# Patient Record
Sex: FEMALE | Race: OTHER | URBAN - METROPOLITAN AREA
[De-identification: names, ages, dates, MRNs, and addresses within clinical notes are randomized per-mention and may not be internally consistent; named-entity substitution may affect disease eponyms.]

---

## 2019-02-25 ENCOUNTER — EMERGENCY (EMERGENCY)
Facility: HOSPITAL | Age: 24
LOS: 0 days | Discharge: HOME | End: 2019-02-25
Attending: EMERGENCY MEDICINE | Admitting: EMERGENCY MEDICINE

## 2019-02-25 VITALS
SYSTOLIC BLOOD PRESSURE: 108 MMHG | HEART RATE: 72 BPM | DIASTOLIC BLOOD PRESSURE: 53 MMHG | RESPIRATION RATE: 18 BRPM | OXYGEN SATURATION: 99 % | TEMPERATURE: 97 F

## 2019-02-25 DIAGNOSIS — M54.42 LUMBAGO WITH SCIATICA, LEFT SIDE: ICD-10-CM

## 2019-02-25 DIAGNOSIS — Z98.891 HISTORY OF UTERINE SCAR FROM PREVIOUS SURGERY: ICD-10-CM

## 2019-02-25 DIAGNOSIS — M54.5 LOW BACK PAIN: ICD-10-CM

## 2019-02-25 RX ORDER — IBUPROFEN 200 MG
600 TABLET ORAL ONCE
Qty: 0 | Refills: 0 | Status: COMPLETED | OUTPATIENT
Start: 2019-02-25 | End: 2019-02-25

## 2019-02-25 RX ADMIN — Medication 600 MILLIGRAM(S): at 12:21

## 2019-02-25 NOTE — ED PROVIDER NOTE - OBJECTIVE STATEMENT
24 y/o female no PMH p/w back pain. Recently gave birth 1 mo ago. Lower back pain in left lower back, radiates down left leg. No trauma. Pain has been going on for 2 weeks. No fever, chills, nt sweats, no h/o IVDA, no bowel/bladder incontinence.

## 2019-02-25 NOTE — ED PROVIDER NOTE - NS ED ROS FT
Eyes:  No visual changes, eye pain or discharge.  ENMT:  No hearing changes, pain, no sore throat or runny nose, no difficulty swallowing  Cardiac:  No chest pain, SOB or edema. No chest pain with exertion.  Respiratory:  No cough or respiratory distress. No hemoptysis. No history of asthma or RAD.  GI:  No nausea, vomiting, diarrhea or abdominal pain.  :  No dysuria, frequency or burning.  MS:  No myalgia, muscle weakness, joint pain or back pain.  Neuro:  No headache or weakness.  No LOC.  Skin:  No skin rash.

## 2019-02-25 NOTE — ED PROVIDER NOTE - NSFOLLOWUPCLINICS_GEN_ALL_ED_FT
[facility]  Medicine  [address], [place]   Phone: [phone]  Fax:   Follow Up Time: 1-3 Days

## 2019-02-25 NOTE — ED PROVIDER NOTE - PHYSICAL EXAMINATION
Constitutional: no fever, chills, nt sweats  MS:  Left lower back pain. Radiates down left leg. 5/5 strength in both LE's throughout. Diffuse tenderness overlying spine, paraspinal muscles. Straight leg test (+) b/l.   Neuro:  No headache or weakness.  No LOC. Sensation equal and intact in both LE's.   Skin:  No skin rash. No erythema/vesicles overlying back.

## 2019-02-25 NOTE — ED ADULT NURSE NOTE - OBJECTIVE STATEMENT
Pt c/o back pain x2 weeks. Pt denies trauma or injury. Pt states she had a  in January. Denies any other symptoms.

## 2019-02-25 NOTE — ED PROVIDER NOTE - PROGRESS NOTE DETAILS
Likely musculoskeletal pain. Motrin given in ED. Will give f/u to clinic here. Pt given strict return precautiosn including bowel/bladder incontinence, development of fever/rigors, inability to tolerate po. ATTENDING NOTE:   22 y/o F , presenting with left lower back pain radiating from SI joint down to posterior leg. No trauma, numbness, tingling, night sweats, weight loss, chronic steroid use, immunosuppressant fever or vomiting. No bowel or bladder incontinence. Exam: PE: normal HEENT. Heart RRR. Lungs CTAB. Abdomen soft NTND. Extremities 2+ b/l pulses intact. Neuro normal speech, strength 5/5 all extremities, sensation grossly intact. Normal gait.  A/P: Given exercises and care instructions. Return precautions advised for back pain. Likely sciatica.

## 2019-02-25 NOTE — ED PROVIDER NOTE - CLINICAL SUMMARY MEDICAL DECISION MAKING FREE TEXT BOX
pw back pain with posterior leg radiation, likely sciatica, given stretches and medication in the ED with improvement. Counseled on medications for back pain use and safety profile in breastfeeding. Patient to be discharged from ED. Any available test results were discussed with patient and/or family. Verbal instructions given, including instructions to return to ED immediately for any new, worsening, or concerning symptoms. Patient endorsed understanding. Written discharge instructions additionally given, including follow-up plan.

## 2019-03-08 RX ORDER — CYCLOBENZAPRINE HYDROCHLORIDE 10 MG/1
1 TABLET, FILM COATED ORAL
Qty: 15 | Refills: 0 | OUTPATIENT
Start: 2019-03-08 | End: 2019-03-12

## 2019-09-14 ENCOUNTER — EMERGENCY (EMERGENCY)
Facility: HOSPITAL | Age: 24
LOS: 0 days | Discharge: HOME | End: 2019-09-14
Admitting: EMERGENCY MEDICINE
Payer: MEDICAID

## 2019-09-14 VITALS
RESPIRATION RATE: 16 BRPM | HEART RATE: 101 BPM | TEMPERATURE: 96 F | DIASTOLIC BLOOD PRESSURE: 56 MMHG | SYSTOLIC BLOOD PRESSURE: 112 MMHG | OXYGEN SATURATION: 99 %

## 2019-09-14 VITALS — HEART RATE: 84 BPM

## 2019-09-14 DIAGNOSIS — R05 COUGH: ICD-10-CM

## 2019-09-14 DIAGNOSIS — R09.81 NASAL CONGESTION: ICD-10-CM

## 2019-09-14 DIAGNOSIS — J02.9 ACUTE PHARYNGITIS, UNSPECIFIED: ICD-10-CM

## 2019-09-14 PROCEDURE — 99283 EMERGENCY DEPT VISIT LOW MDM: CPT

## 2019-09-14 NOTE — ED PROVIDER NOTE - ENMT NEGATIVE STATEMENT, MLM
Ears: no ear pain and no hearing problems.Nose: + nasal congestion and no nasal drainage.Mouth/Throat: + sore throat; no dysphagia, no hoarseness.Neck: no lumps, no pain, no stiffness and no swollen glands.

## 2019-09-14 NOTE — ED PROVIDER NOTE - PATIENT PORTAL LINK FT
You can access the FollowMyHealth Patient Portal offered by [facility] by registering at the following website: [email or web address]. By joining [employer]’s FollowMyHealth portal, you will also be able to view your health information using other applications (apps) compatible with our system.

## 2019-09-14 NOTE — ED PROVIDER NOTE - NSFOLLOWUPCLINICS_GEN_ALL_ED_FT
[facility]  Medicine  [address], [place]   Phone: [phone]  Fax:   Follow Up Time: 1-3 Days

## 2019-09-14 NOTE — ED PROVIDER NOTE - NSFOLLOWUPINSTRUCTIONS_ED_ALL_ED_FT
Cough    Coughing is a reflex that clears your throat and your airways. Coughing helps to heal and protect your lungs. It is normal to cough occasionally, but a cough that happens with other symptoms or lasts a long time may be a sign of a condition that needs treatment. Coughing may be caused by infections, asthma or COPD, smoking, postnasal drip, gastroesophageal reflux, as well as other medical conditions. Take medicines only as instructed by your health care provider. Avoid anything that causes you to cough at work or at home including smoking.    SEEK IMMEDIATE MEDICAL CARE IF YOU HAVE THE FOLLOWING SYMPTOMS: coughing up blood, shortness of breath, rapid heart rate, chest pain, unexplained weight loss or night sweats.    Upper Respiratory Infection, Adult  An upper respiratory infection (URI) is a common viral infection of the nose, throat, and upper air passages that lead to the lungs. The most common type of URI is the common cold. URIs usually get better on their own, without medical treatment.    What are the causes?  A URI is caused by a virus. You may catch a virus by:    Breathing in droplets from an infected person's cough or sneeze.  Touching something that has been exposed to the virus (contaminated) and then touching your mouth, nose, or eyes.    What increases the risk?  You are more likely to get a URI if:    You are very young or very old.  It is joanne or winter.  You have close contact with others, such as at a , school, or health care facility.  You smoke.  You have long-term (chronic) heart or lung disease.  You have a weakened disease-fighting (immune) system.  You have nasal allergies or asthma.  You are experiencing a lot of stress.  You work in an area that has poor air circulation.  You have poor nutrition.    What are the signs or symptoms?  A URI usually involves some of the following symptoms:    Runny or stuffy (congested) nose.  Sneezing.  Cough.  Sore throat.  Headache.  Fatigue.  Fever.  Loss of appetite.  Pain in your forehead, behind your eyes, and over your cheekbones (sinus pain).  Muscle aches.  Redness or irritation of the eyes.  Pressure in the ears or face.    How is this diagnosed?  This condition may be diagnosed based on your medical history and symptoms, and a physical exam. Your health care provider may use a cotton swab to take a mucus sample from your nose (nasal swab). This sample can be tested to determine what virus is causing the illness.    How is this treated?  URIs usually get better on their own within 7–10 days. You can take steps at home to relieve your symptoms. Medicines cannot cure URIs, but your health care provider may recommend certain medicines to help relieve symptoms, such as:    Over-the-counter cold medicines.  Cough suppressants. Coughing is a type of defense against infection that helps to clear the respiratory system, so take these medicines only as recommended by your health care provider.  Fever-reducing medicines.    Follow these instructions at home:  Activity     Rest as needed.  If you have a fever, stay home from work or school until your fever is gone or until your health care provider says you are no longer contagious. Your health care provider may have you wear a face mask to prevent your infection from spreading.  Relieving symptoms     Gargle with a salt-water mixture 3–4 times a day or as needed. To make a salt-water mixture, completely dissolve ½–1 tsp of salt in 1 cup of warm water.  Use a cool-mist humidifier to add moisture to the air. This can help you breathe more easily.  Eating and drinking     Drink enough fluid to keep your urine pale yellow.  ImageEat soups and other clear broths.  General instructions     Take over-the-counter and prescription medicines only as told by your health care provider. These include cold medicines, fever reducers, and cough suppressants.  Do not use any products that contain nicotine or tobacco, such as cigarettes and e-cigarettes. If you need help quitting, ask your health care provider.   Stay away from secondhand smoke.  Stay up to date on all immunizations, including the yearly (annual) flu vaccine.  ImageKeep all follow-up visits as told by your health care provider. This is important.  How to prevent the spread of infection to others     ImageURIs can be passed from person to person (are contagious). To prevent the infection from spreading:    Wash your hands often with soap and water. If soap and water are not available, use hand .  Avoid touching your mouth, face, eyes, or nose.  Cough or sneeze into a tissue or your sleeve or elbow instead of into your hand or into the air.    Contact a health care provider if:  You are getting worse instead of better.  You have a fever or chills.  Your mucus is brown or red.  You have yellow or brown discharge coming from your nose.  You have pain in your face, especially when you bend forward.  You have swollen neck glands.  You have pain while swallowing.  You have white areas in the back of your throat.  Get help right away if:  You have shortness of breath that gets worse.  You have severe or persistent:    Headache.  Ear pain.  Sinus pain.  Chest pain.    You have chronic lung disease along with any of the following:    Wheezing.  Prolonged cough.  Coughing up blood.  A change in your usual mucus.    You have a stiff neck.  You have changes in your:    Vision.  Hearing.  Thinking.  Mood.    Summary  An upper respiratory infection (URI) is a common infection of the nose, throat, and upper air passages that lead to the lungs.  A URI is caused by a virus.  URIs usually get better on their own within 7–10 days.  Medicines cannot cure URIs, but your health care provider may recommend certain medicines to help relieve symptoms.

## 2019-09-14 NOTE — ED PROVIDER NOTE - OBJECTIVE STATEMENT
23 y/o F, no significant PMHx, presents to the ED with complaints of a cough and nasal congestion x one week. She states that her two children have been ill with similar symptoms. She denies productive cough, fever, chills, nausea, vomiting, chest pain, dyspnea, abdominal pain, back pain and recent travel. She has been taking OTC Dimetapp with some symptomatic improvement.

## 2019-09-21 ENCOUNTER — EMERGENCY (EMERGENCY)
Facility: HOSPITAL | Age: 24
LOS: 0 days | Discharge: HOME | End: 2019-09-21
Admitting: EMERGENCY MEDICINE
Payer: MEDICAID

## 2019-09-21 VITALS
WEIGHT: 132.94 LBS | OXYGEN SATURATION: 99 % | RESPIRATION RATE: 18 BRPM | SYSTOLIC BLOOD PRESSURE: 114 MMHG | TEMPERATURE: 97 F | HEART RATE: 101 BPM | DIASTOLIC BLOOD PRESSURE: 62 MMHG

## 2019-09-21 VITALS — HEART RATE: 87 BPM

## 2019-09-21 DIAGNOSIS — M54.5 LOW BACK PAIN: ICD-10-CM

## 2019-09-21 DIAGNOSIS — M54.6 PAIN IN THORACIC SPINE: ICD-10-CM

## 2019-09-21 DIAGNOSIS — M54.9 DORSALGIA, UNSPECIFIED: ICD-10-CM

## 2019-09-21 LAB
APPEARANCE UR: CLEAR — SIGNIFICANT CHANGE UP
BACTERIA # UR AUTO: NEGATIVE — SIGNIFICANT CHANGE UP
BILIRUB UR-MCNC: NEGATIVE — SIGNIFICANT CHANGE UP
COLOR SPEC: YELLOW — SIGNIFICANT CHANGE UP
DIFF PNL FLD: ABNORMAL
EPI CELLS # UR: 4 /HPF — SIGNIFICANT CHANGE UP (ref 0–5)
GLUCOSE UR QL: NEGATIVE — SIGNIFICANT CHANGE UP
HYALINE CASTS # UR AUTO: 1 /LPF — SIGNIFICANT CHANGE UP (ref 0–7)
KETONES UR-MCNC: SIGNIFICANT CHANGE UP
LEUKOCYTE ESTERASE UR-ACNC: NEGATIVE — SIGNIFICANT CHANGE UP
NITRITE UR-MCNC: NEGATIVE — SIGNIFICANT CHANGE UP
PH UR: 6 — SIGNIFICANT CHANGE UP (ref 5–8)
PROT UR-MCNC: ABNORMAL
RBC CASTS # UR COMP ASSIST: 3 /HPF — SIGNIFICANT CHANGE UP (ref 0–4)
SP GR SPEC: 1.04 — HIGH (ref 1.01–1.02)
UROBILINOGEN FLD QL: SIGNIFICANT CHANGE UP
WBC UR QL: 2 /HPF — SIGNIFICANT CHANGE UP (ref 0–5)

## 2019-09-21 PROCEDURE — 99284 EMERGENCY DEPT VISIT MOD MDM: CPT

## 2019-09-21 PROCEDURE — 76775 US EXAM ABDO BACK WALL LIM: CPT | Mod: 26

## 2019-09-21 PROCEDURE — 76856 US EXAM PELVIC COMPLETE: CPT | Mod: 26

## 2019-09-21 RX ORDER — ACETAMINOPHEN 500 MG
975 TABLET ORAL ONCE
Refills: 0 | Status: COMPLETED | OUTPATIENT
Start: 2019-09-21 | End: 2019-09-21

## 2019-09-21 NOTE — ED PROVIDER NOTE - PATIENT PORTAL LINK FT
You can access the FollowMyHealth Patient Portal offered by [facility] by registering at the following website: [email or web address]. By joining [employer]’s FollowMyHealth portal, you will also be able to view your health information using other applications (apps) compatible with our system.

## 2019-09-21 NOTE — ED PROVIDER NOTE - NS ED ROS FT
Review of Systems:  	•	CONSTITUTIONAL - no fever, no diaphoresis, no chills  	•	SKIN - no rash  	•	HEMATOLOGIC - no bleeding, no bruising  	•	EYES - no eye pain, no blurry vision  	•	ENT - no congestion  	•	RESPIRATORY - no shortness of breath, no cough  	•	CARDIAC - no chest pain, no palpitations  	•	GI - no abd pain, no nausea, no vomiting, no diarrhea, no constipation  	•	GENITO-URINARY - no vaginal bleeding, no vaginal discharge, no dysuria; no hematuria, no increased urinary frequency  	•	MUSCULOSKELETAL - +back pain, no joint paint, no swelling, no redness  	•	NEUROLOGIC - no weakness, no headache, no paresthesias, no LOC  	•	PSYCH - no anxiety, no depression  	All other ROS are negative except as documented in HPI.

## 2019-09-21 NOTE — ED PROVIDER NOTE - CLINICAL SUMMARY MEDICAL DECISION MAKING FREE TEXT BOX
Pt with right sided lower back pain x 2 days that started an hour after sexual activity. Pelvic and renal ultrasound normal. UA small amount of blood - patient is just completing menstrual period. Upreg neg. Back pain is reproducible. Advised tylenol and pmd follow up. I have discussed the discharge plan with the patient. The patient agrees with the plan, as discussed.  The patient understands Emergency Department diagnosis is a preliminary diagnosis often based on limited information and that the patient must adhere to the follow-up plan as discussed.  The patient understands that if the symptoms worsen or if prescribed medications do not have the desired/planned effect that the patient may return to the Emergency Department at any time for further evaluation and treatment.

## 2019-09-21 NOTE — ED PROVIDER NOTE - PROVIDER TOKENS
FREE:[LAST:[Your primary care provider],PHONE:[(   )    -],FAX:[(   )    -],FOLLOWUP:[1-3 Days]],FREE:[LAST:[Your obgyn],PHONE:[(   )    -],FAX:[(   )    -],FOLLOWUP:[Routine]]

## 2019-09-21 NOTE — ED PROVIDER NOTE - CARE PROVIDER_API CALL
Your primary care provider,   Phone: (   )    -  Fax: (   )    -  Follow Up Time: 1-3 Days    Your obgyn,   Phone: (   )    -  Fax: (   )    -  Follow Up Time: Routine

## 2019-09-21 NOTE — ED PROVIDER NOTE - PHYSICAL EXAMINATION
VITAL SIGNS: I have reviewed nursing notes and confirm.  CONSTITUTIONAL: Well-developed; well-nourished; in no acute distress.  SKIN: Skin exam is warm and dry, no acute rash.  HEAD: Normocephalic; atraumatic.  EYES: PERRL, EOM intact; conjunctiva and sclera clear.  ENT: No nasal discharge; airway clear.   NECK: Supple; non tender.  CARD: S1, S2 normal; no murmurs, gallops, or rubs. Regular rate and rhythm.  RESP: Clear to auscultation bilaterally. No wheezes, rales or rhonchi.  ABD: Normal bowel sounds; soft; non-distended; non-tender.   : External genitalia WNL, without lesions. mucosa pink and moist. No lesions in vaginal canal/on cervix. cervical os closed without discharge. no CMT, adnexal fullness, adnexal TTP. Chaperoned by Alberta.   EXT/MSK: Normal ROM. No edema. +Tenderness to right paraspinal of thoracic and lumbar region.   LYMPH: No acute cervical adenopathy.  NEURO: Alert, oriented. Grossly unremarkable. No focal deficits.  PSYCH: Cooperative, appropriate.

## 2019-09-21 NOTE — ED PROVIDER NOTE - OBJECTIVE STATEMENT
25 yo F with pmhx of sciatica presenting with constant right lower back pain x 2 days Symptoms 23 yo F with pmhx of sciatica presenting with constant right lower back pain x 2 days States symptoms started after sexual activity 2 days ago. Has not taken anything for pain. Pain started 23 yo F with pmhx of sciatica presenting with constant right lower back pain x 2 days States symptoms started after sexual activity 2 days ago. Has not taken anything for pain. States pain is worse with movement. No pain during sex. States she just finished her menstrual period. No alleviating factors. No cp, sob, fever, chills, abdominal pain, nausea, vomiting, diarrhea, back pain, urinary symptoms, headache, dizziness, paresthesias, or weakness. 25 yo F with pmhx of sciatica presenting with constant right lower back pain x 2 days States symptoms started after sexual activity 2 days ago. Has not taken anything for pain. States pain is worse with movement. No pain during sex. States everything her significant other ejaculates inside her she has burning which subsides afterwards.  States she just finished her menstrual period. No alleviating factors. No cp, sob, fever, chills, abdominal pain, nausea, vomiting, diarrhea, back pain, urinary symptoms, headache, dizziness, paresthesias, or weakness.

## 2019-09-21 NOTE — ED ADULT TRIAGE NOTE - PAIN: PRESENCE, MLM
Spoke with pt gave dosing and next inr check date pt voiced understanding.   hh faxed   
lvm to call for questioning  
complains of pain/discomfort

## 2019-09-21 NOTE — ED ADULT NURSE NOTE - OBJECTIVE STATEMENT
pt c/o lower back pain x 2 days after sexual activity, pain worse with movement. denies taking anything for pain,  no fever, chills, urinary symptoms.

## 2019-09-23 LAB
C TRACH RRNA SPEC QL NAA+PROBE: SIGNIFICANT CHANGE UP
CULTURE RESULTS: SIGNIFICANT CHANGE UP
N GONORRHOEA RRNA SPEC QL NAA+PROBE: SIGNIFICANT CHANGE UP
SPECIMEN SOURCE: SIGNIFICANT CHANGE UP
SPECIMEN SOURCE: SIGNIFICANT CHANGE UP

## 2019-11-07 ENCOUNTER — EMERGENCY (EMERGENCY)
Facility: HOSPITAL | Age: 24
LOS: 0 days | Discharge: HOME | End: 2019-11-07
Attending: EMERGENCY MEDICINE | Admitting: EMERGENCY MEDICINE
Payer: MEDICAID

## 2019-11-07 VITALS
OXYGEN SATURATION: 99 % | DIASTOLIC BLOOD PRESSURE: 69 MMHG | SYSTOLIC BLOOD PRESSURE: 121 MMHG | TEMPERATURE: 98 F | RESPIRATION RATE: 18 BRPM | HEART RATE: 82 BPM

## 2019-11-07 VITALS
HEART RATE: 96 BPM | TEMPERATURE: 98 F | DIASTOLIC BLOOD PRESSURE: 55 MMHG | SYSTOLIC BLOOD PRESSURE: 120 MMHG | RESPIRATION RATE: 18 BRPM | OXYGEN SATURATION: 98 %

## 2019-11-07 DIAGNOSIS — Z3A.01 LESS THAN 8 WEEKS GESTATION OF PREGNANCY: ICD-10-CM

## 2019-11-07 DIAGNOSIS — O20.8 OTHER HEMORRHAGE IN EARLY PREGNANCY: ICD-10-CM

## 2019-11-07 DIAGNOSIS — N93.9 ABNORMAL UTERINE AND VAGINAL BLEEDING, UNSPECIFIED: ICD-10-CM

## 2019-11-07 DIAGNOSIS — R10.30 LOWER ABDOMINAL PAIN, UNSPECIFIED: ICD-10-CM

## 2019-11-07 LAB
ALBUMIN SERPL ELPH-MCNC: 4.7 G/DL — SIGNIFICANT CHANGE UP (ref 3.5–5.2)
ALP SERPL-CCNC: 84 U/L — SIGNIFICANT CHANGE UP (ref 30–115)
ALT FLD-CCNC: 19 U/L — SIGNIFICANT CHANGE UP (ref 0–41)
ANION GAP SERPL CALC-SCNC: 12 MMOL/L — SIGNIFICANT CHANGE UP (ref 7–14)
APPEARANCE UR: CLEAR — SIGNIFICANT CHANGE UP
AST SERPL-CCNC: 20 U/L — SIGNIFICANT CHANGE UP (ref 0–41)
BACTERIA # UR AUTO: ABNORMAL
BASOPHILS # BLD AUTO: 0.05 K/UL — SIGNIFICANT CHANGE UP (ref 0–0.2)
BASOPHILS NFR BLD AUTO: 0.7 % — SIGNIFICANT CHANGE UP (ref 0–1)
BILIRUB SERPL-MCNC: 0.6 MG/DL — SIGNIFICANT CHANGE UP (ref 0.2–1.2)
BILIRUB UR-MCNC: NEGATIVE — SIGNIFICANT CHANGE UP
BLD GP AB SCN SERPL QL: SIGNIFICANT CHANGE UP
BUN SERPL-MCNC: 16 MG/DL — SIGNIFICANT CHANGE UP (ref 10–20)
CALCIUM SERPL-MCNC: 9.7 MG/DL — SIGNIFICANT CHANGE UP (ref 8.5–10.1)
CHLORIDE SERPL-SCNC: 107 MMOL/L — SIGNIFICANT CHANGE UP (ref 98–110)
CO2 SERPL-SCNC: 23 MMOL/L — SIGNIFICANT CHANGE UP (ref 17–32)
COLOR SPEC: YELLOW — SIGNIFICANT CHANGE UP
CREAT SERPL-MCNC: 0.7 MG/DL — SIGNIFICANT CHANGE UP (ref 0.7–1.5)
DIFF PNL FLD: ABNORMAL
EOSINOPHIL # BLD AUTO: 0.15 K/UL — SIGNIFICANT CHANGE UP (ref 0–0.7)
EOSINOPHIL NFR BLD AUTO: 2.1 % — SIGNIFICANT CHANGE UP (ref 0–8)
EPI CELLS # UR: 3 /HPF — SIGNIFICANT CHANGE UP (ref 0–5)
GLUCOSE SERPL-MCNC: 93 MG/DL — SIGNIFICANT CHANGE UP (ref 70–99)
GLUCOSE UR QL: NEGATIVE — SIGNIFICANT CHANGE UP
HCG SERPL-ACNC: 5118 MIU/ML — HIGH
HCT VFR BLD CALC: 35.8 % — LOW (ref 37–47)
HGB BLD-MCNC: 11.6 G/DL — LOW (ref 12–16)
HYALINE CASTS # UR AUTO: 2 /LPF — SIGNIFICANT CHANGE UP (ref 0–7)
IMM GRANULOCYTES NFR BLD AUTO: 0.3 % — SIGNIFICANT CHANGE UP (ref 0.1–0.3)
KETONES UR-MCNC: NEGATIVE — SIGNIFICANT CHANGE UP
LEUKOCYTE ESTERASE UR-ACNC: NEGATIVE — SIGNIFICANT CHANGE UP
LYMPHOCYTES # BLD AUTO: 2.6 K/UL — SIGNIFICANT CHANGE UP (ref 1.2–3.4)
LYMPHOCYTES # BLD AUTO: 36.9 % — SIGNIFICANT CHANGE UP (ref 20.5–51.1)
MCHC RBC-ENTMCNC: 29.1 PG — SIGNIFICANT CHANGE UP (ref 27–31)
MCHC RBC-ENTMCNC: 32.4 G/DL — SIGNIFICANT CHANGE UP (ref 32–37)
MCV RBC AUTO: 89.9 FL — SIGNIFICANT CHANGE UP (ref 81–99)
MONOCYTES # BLD AUTO: 0.49 K/UL — SIGNIFICANT CHANGE UP (ref 0.1–0.6)
MONOCYTES NFR BLD AUTO: 7 % — SIGNIFICANT CHANGE UP (ref 1.7–9.3)
NEUTROPHILS # BLD AUTO: 3.74 K/UL — SIGNIFICANT CHANGE UP (ref 1.4–6.5)
NEUTROPHILS NFR BLD AUTO: 53 % — SIGNIFICANT CHANGE UP (ref 42.2–75.2)
NITRITE UR-MCNC: NEGATIVE — SIGNIFICANT CHANGE UP
NRBC # BLD: 0 /100 WBCS — SIGNIFICANT CHANGE UP (ref 0–0)
PH UR: 6 — SIGNIFICANT CHANGE UP (ref 5–8)
PLATELET # BLD AUTO: 211 K/UL — SIGNIFICANT CHANGE UP (ref 130–400)
POTASSIUM SERPL-MCNC: 4 MMOL/L — SIGNIFICANT CHANGE UP (ref 3.5–5)
POTASSIUM SERPL-SCNC: 4 MMOL/L — SIGNIFICANT CHANGE UP (ref 3.5–5)
PROT SERPL-MCNC: 6.9 G/DL — SIGNIFICANT CHANGE UP (ref 6–8)
PROT UR-MCNC: SIGNIFICANT CHANGE UP
RBC # BLD: 3.98 M/UL — LOW (ref 4.2–5.4)
RBC # FLD: 13.8 % — SIGNIFICANT CHANGE UP (ref 11.5–14.5)
RBC CASTS # UR COMP ASSIST: 1 /HPF — SIGNIFICANT CHANGE UP (ref 0–4)
SODIUM SERPL-SCNC: 142 MMOL/L — SIGNIFICANT CHANGE UP (ref 135–146)
SP GR SPEC: 1.03 — HIGH (ref 1.01–1.02)
UROBILINOGEN FLD QL: SIGNIFICANT CHANGE UP
WBC # BLD: 7.05 K/UL — SIGNIFICANT CHANGE UP (ref 4.8–10.8)
WBC # FLD AUTO: 7.05 K/UL — SIGNIFICANT CHANGE UP (ref 4.8–10.8)
WBC UR QL: 3 /HPF — SIGNIFICANT CHANGE UP (ref 0–5)

## 2019-11-07 PROCEDURE — 99284 EMERGENCY DEPT VISIT MOD MDM: CPT

## 2019-11-07 PROCEDURE — 76830 TRANSVAGINAL US NON-OB: CPT | Mod: 26

## 2019-11-07 NOTE — ED PROVIDER NOTE - NSFOLLOWUPINSTRUCTIONS_ED_ALL_ED_FT
Subchorionic Hematoma  A subchorionic hematoma is a gathering of blood between the outer wall of the embryo (chorion) and the inner wall of the womb (uterus).    ImageThis condition can cause vaginal bleeding. If they cause little or no vaginal bleeding, early small hematomas usually shrink on their own and do not affect your baby or pregnancy. When bleeding starts later in pregnancy, or if the hematoma is larger or occurs in older pregnant women, the condition may be more serious. Larger hematomas may get bigger, which increases the chances of miscarriage. This condition also increases the risk of:    Premature separation of the placenta from the uterus.  Premature () labor.  Stillbirth.    What are the causes?  The exact cause of this condition is not known. It occurs when blood is trapped between the placenta and the uterine wall because the placenta has  from the original site of implantation.    What increases the risk?  You are more likely to develop this condition if:    You were treated with fertility medicines.  You conceived through in vitro fertilization (IVF).    What are the signs or symptoms?  Symptoms of this condition include:    Vaginal spotting or bleeding.  Contractions of the uterus. These cause abdominal pain.    Sometimes you may have no symptoms and the bleeding may only be seen when ultrasound images are taken (transvaginal ultrasound).    How is this diagnosed?  This condition is diagnosed based on a physical exam. This includes a pelvic exam. You may also have other tests, including:    Blood tests.  Urine tests.  Ultrasound of the abdomen.    How is this treated?  Treatment for this condition can vary. Treatment may include:    Watchful waiting. You will be monitored closely for any changes in bleeding. During this stage:    The hematoma may be reabsorbed by the body.  The hematoma may separate the fluid-filled space containing the embryo (gestational sac) from the wall of the womb (endometrium).    Medicines.  Activity restriction. This may be needed until the bleeding stops.    Follow these instructions at home:  Stay on bed rest if told to do so by your health care provider.  Do not lift anything that is heavier than 10 lbs. (4.5 kg) or as told by your health care provider.  Do not use any products that contain nicotine or tobacco, such as cigarettes and e-cigarettes. If you need help quitting, ask your health care provider.  Track and write down the number of pads you use each day and how soaked (saturated) they are.  Do not use tampons.  Keep all follow-up visits as told by your health care provider. This is important. Your health care provider may ask you to have follow-up blood tests or ultrasound tests or both.  Contact a health care provider if:  You have any vaginal bleeding.  You have a fever.  Get help right away if:  You have severe cramps in your stomach, back, abdomen, or pelvis.  You pass large clots or tissue. Save any tissue for your health care provider to look at.  You have more vaginal bleeding, and you faint or become lightheaded or weak.  Summary  A subchorionic hematoma is a gathering of blood between the outer wall of the placenta and the uterus.  This condition can cause vaginal bleeding.  Sometimes you may have no symptoms and the bleeding may only be seen when ultrasound images are taken.  Treatment may include watchful waiting, medicines, or activity restriction.  This information is not intended to replace advice given to you by your health care provider. Make sure you discuss any questions you have with your health care provider.

## 2019-11-07 NOTE — ED PROVIDER NOTE - OBJECTIVE STATEMENT
24F A0 6 weeks pregnant by dates presents with brown vaginal discharge for the past 2-3 days. PT states that she had the same discharge with prior pregnancies but began to feel some suprapubic cramping which brought her in today. Denies fever, chills, n/v/d, CP, SOB, LE pain or swelling, dysuria.  States that she has not yet had US for this pregnancy. Does not know name of OB.

## 2019-11-07 NOTE — ED PROVIDER NOTE - NSFOLLOWUPCLINICS_GEN_ALL_ED_FT
[facility]  OB/GYN  [address] [place] [address]  Phone: [phone]  Fax:   Follow Up Time: 1-3 Days

## 2019-11-07 NOTE — ED PROVIDER NOTE - CLINICAL SUMMARY MEDICAL DECISION MAKING FREE TEXT BOX
Patient presented with vaginal discharge and lower abdominal cramping, 6 weeks pregnant by LMP. Otherwise afebrile, Hd stable, abd completely non-tender. Obtained labs which were grossly unremarkable, beta quant 5118, UA negative for infection. Pelvic US confirmed (+) IUP with normal FHR but with (+) small subchorionic hemorrhage. patient otherwise ambulatory in ED, tolerates PO. Informed of results and patient agrees to follow up as outpatient with her OBGYN. Agrees to return to ED for any new or worsening symptoms.

## 2019-11-07 NOTE — ED PROVIDER NOTE - ATTENDING CONTRIBUTION TO CARE
24 year old female, , 6 weeks gestation by LMP, presenting with brown vaginal discharge x 2-3 days. States it is the same as prior pregnancies but this time she also had bilateral lower abdominal pain described as cramping, non-radiating, no palliative or provocative factors, mild severity. Otherwise denies other symptoms including fevers, headache, vision changes, weakness/numbness, confusion, URI symptoms, neck pain, chest pain, back pain, dyspnea, cough, palpitations, nausea, vomiting, diarrhea, constipation, blood in stool/dark stools, urinary symptoms, vaginal bleeding, leg swelling, rash, recent travel or sick contacts.    Vital Signs: I have reviewed the initial vital signs.  Constitutional: NAD, well-nourished, appears stated age, no acute distress.  HEENT: Airway patent, moist MM, no erythema/swelling/deformity of oral structures. EOMI, PERRLA.  CV: regular rate, regular rhythm, well-perfused extremities, 2+ b/l DP and radial pulses equal.  Lungs: BCTA, no increased WOB.  ABD: NTND, no guarding or rebound, no pulsatile mass, no hernias.   MSK: Neck supple, nontender, nl ROM, no stepoff. Chest nontender. Back nontender in TLS spine or to b/l bony structures or flanks. Ext nontender, nl rom, no deformity.   INTEG: Skin warm, dry, no rash.  NEURO: A&Ox3, normal strength, nl sensation throughout, normal speech.   PSYCH: Calm, cooperative, normal affect and interaction.    Abd non-tender. Will obtain labs, pelvic US, UA, re-eval.

## 2019-11-07 NOTE — ED PROVIDER NOTE - PATIENT PORTAL LINK FT
You can access the FollowMyHealth Patient Portal offered by [facility] by registering at the following website: [email or web address]. By joining [employer]’s FollowMyHealth portal, you will also be able to view your health information using other applications (apps) compatible with our system.

## 2019-11-07 NOTE — ED PROVIDER NOTE - PHYSICAL EXAMINATION
CONSTITUTIONAL: Well-developed; well-nourished; in no acute distress.   SKIN: warm, dry  HEAD: Normocephalic; atraumatic.  EYES: PERRL, EOMI, no conjunctival erythema  ENT: No nasal discharge; airway clear.  NECK: Supple; non tender.  CARD: S1, S2 normal; no murmurs, gallops, or rubs. Regular rate and rhythm.   RESP: No wheezes, rales or rhonchi.  ABD: soft, mild TTP suprapubically, no peritoneal signs. No CVA tenderness.  EXT: Normal ROM.  No clubbing, cyanosis or edema.   LYMPH: No acute cervical adenopathy.  NEURO: Alert, oriented, grossly unremarkable  PSYCH: Cooperative, appropriate. CONSTITUTIONAL: Well-developed; well-nourished; in no acute distress.   SKIN: warm, dry  HEAD: Normocephalic; atraumatic.  EYES: PERRL, EOMI, no conjunctival erythema  ENT: No nasal discharge; airway clear.  NECK: Supple; non tender.  CARD: S1, S2 normal; no murmurs, gallops, or rubs. Regular rate and rhythm.   RESP: No wheezes, rales or rhonchi.  ABD: soft, mild TTP suprapubically, no peritoneal signs. No CVA tenderness.  : no discharge noted, no CMT, no adnexal tenderness  EXT: Normal ROM.  No clubbing, cyanosis or edema.   LYMPH: No acute cervical adenopathy.  NEURO: Alert, oriented, grossly unremarkable  PSYCH: Cooperative, appropriate.

## 2019-11-07 NOTE — ED ADULT NURSE REASSESSMENT NOTE - NS ED NURSE REASSESS COMMENT FT1
received pt from day shift RN , pt AO x 4 , denies any pain as of this time , denies nausea, no SOB , ambulatory , transported to [facility]

## 2019-11-07 NOTE — ED ADULT NURSE NOTE - NSIMPLEMENTINTERV_GEN_ALL_ED
Implemented All Universal Safety Interventions:  [place] to call system. Call bell, personal items and telephone within reach. Instruct patient to call for assistance. Room bathroom lighting operational. Non-slip footwear when patient is off stretcher. Physically safe environment: no spills, clutter or unnecessary equipment. Stretcher in lowest position, wheels locked, appropriate side rails in place.

## 2019-11-07 NOTE — ED PROVIDER NOTE - PROGRESS NOTE DETAILS
US shows IUP and subchorionic hematoma. PT to be d/c with strict return precautions. She has follow up with OB at [facility] tomorrow. Cautioned to return if any new or worsening symptoms.

## 2019-11-07 NOTE — ED ADULT NURSE NOTE - OBJECTIVE STATEMENT
Pt presents to ED c/o pain to lower abdomen & dark colored discharge that began today. Pt is 6 weeks pregnant.

## 2019-11-07 NOTE — ED ADULT TRIAGE NOTE - CHIEF COMPLAINT QUOTE
Pt c/o pain to lower abdomen, and dark colored discharge that she noticed today, pt is 6 wks pregnant

## 2019-11-09 LAB
CULTURE RESULTS: SIGNIFICANT CHANGE UP
SPECIMEN SOURCE: SIGNIFICANT CHANGE UP

## 2019-11-13 ENCOUNTER — EMERGENCY (EMERGENCY)
Facility: HOSPITAL | Age: 24
LOS: 0 days | Discharge: HOME | End: 2019-11-13
Attending: EMERGENCY MEDICINE | Admitting: EMERGENCY MEDICINE
Payer: MEDICAID

## 2019-11-13 VITALS
OXYGEN SATURATION: 100 % | RESPIRATION RATE: 18 BRPM | TEMPERATURE: 97 F | DIASTOLIC BLOOD PRESSURE: 58 MMHG | SYSTOLIC BLOOD PRESSURE: 120 MMHG | HEART RATE: 106 BPM

## 2019-11-13 DIAGNOSIS — F17.200 NICOTINE DEPENDENCE, UNSPECIFIED, UNCOMPLICATED: ICD-10-CM

## 2019-11-13 DIAGNOSIS — N93.9 ABNORMAL UTERINE AND VAGINAL BLEEDING, UNSPECIFIED: ICD-10-CM

## 2019-11-13 DIAGNOSIS — O03.9 COMPLETE OR UNSPECIFIED SPONTANEOUS ABORTION WITHOUT COMPLICATION: ICD-10-CM

## 2019-11-13 PROBLEM — Z78.9 OTHER SPECIFIED HEALTH STATUS: Chronic | Status: ACTIVE | Noted: 2019-11-07

## 2019-11-13 LAB
ALBUMIN SERPL ELPH-MCNC: 4.5 G/DL — SIGNIFICANT CHANGE UP (ref 3.5–5.2)
ALP SERPL-CCNC: 82 U/L — SIGNIFICANT CHANGE UP (ref 30–115)
ALT FLD-CCNC: 12 U/L — SIGNIFICANT CHANGE UP (ref 0–41)
ANION GAP SERPL CALC-SCNC: 13 MMOL/L — SIGNIFICANT CHANGE UP (ref 7–14)
APPEARANCE UR: ABNORMAL
AST SERPL-CCNC: 16 U/L — SIGNIFICANT CHANGE UP (ref 0–41)
BACTERIA # UR AUTO: NEGATIVE — SIGNIFICANT CHANGE UP
BASOPHILS # BLD AUTO: 0.05 K/UL — SIGNIFICANT CHANGE UP (ref 0–0.2)
BASOPHILS NFR BLD AUTO: 1.1 % — HIGH (ref 0–1)
BILIRUB SERPL-MCNC: 0.6 MG/DL — SIGNIFICANT CHANGE UP (ref 0.2–1.2)
BILIRUB UR-MCNC: NEGATIVE — SIGNIFICANT CHANGE UP
BLD GP AB SCN SERPL QL: SIGNIFICANT CHANGE UP
BUN SERPL-MCNC: 12 MG/DL — SIGNIFICANT CHANGE UP (ref 10–20)
CALCIUM SERPL-MCNC: 9.4 MG/DL — SIGNIFICANT CHANGE UP (ref 8.5–10.1)
CHLORIDE SERPL-SCNC: 104 MMOL/L — SIGNIFICANT CHANGE UP (ref 98–110)
CO2 SERPL-SCNC: 22 MMOL/L — SIGNIFICANT CHANGE UP (ref 17–32)
COLOR SPEC: YELLOW — SIGNIFICANT CHANGE UP
CREAT SERPL-MCNC: 0.7 MG/DL — SIGNIFICANT CHANGE UP (ref 0.7–1.5)
DIFF PNL FLD: ABNORMAL
EOSINOPHIL # BLD AUTO: 0.08 K/UL — SIGNIFICANT CHANGE UP (ref 0–0.7)
EOSINOPHIL NFR BLD AUTO: 1.8 % — SIGNIFICANT CHANGE UP (ref 0–8)
EPI CELLS # UR: 8 /HPF — HIGH (ref 0–5)
GLUCOSE SERPL-MCNC: 79 MG/DL — SIGNIFICANT CHANGE UP (ref 70–99)
GLUCOSE UR QL: NEGATIVE — SIGNIFICANT CHANGE UP
HCG SERPL-ACNC: 298.9 MIU/ML — HIGH
HCT VFR BLD CALC: 36.9 % — LOW (ref 37–47)
HGB BLD-MCNC: 11.9 G/DL — LOW (ref 12–16)
HYALINE CASTS # UR AUTO: 2 /LPF — SIGNIFICANT CHANGE UP (ref 0–7)
IMM GRANULOCYTES NFR BLD AUTO: 0.2 % — SIGNIFICANT CHANGE UP (ref 0.1–0.3)
KETONES UR-MCNC: NEGATIVE — SIGNIFICANT CHANGE UP
LEUKOCYTE ESTERASE UR-ACNC: NEGATIVE — SIGNIFICANT CHANGE UP
LYMPHOCYTES # BLD AUTO: 1.9 K/UL — SIGNIFICANT CHANGE UP (ref 1.2–3.4)
LYMPHOCYTES # BLD AUTO: 43 % — SIGNIFICANT CHANGE UP (ref 20.5–51.1)
MCHC RBC-ENTMCNC: 29 PG — SIGNIFICANT CHANGE UP (ref 27–31)
MCHC RBC-ENTMCNC: 32.2 G/DL — SIGNIFICANT CHANGE UP (ref 32–37)
MCV RBC AUTO: 90 FL — SIGNIFICANT CHANGE UP (ref 81–99)
MONOCYTES # BLD AUTO: 0.36 K/UL — SIGNIFICANT CHANGE UP (ref 0.1–0.6)
MONOCYTES NFR BLD AUTO: 8.1 % — SIGNIFICANT CHANGE UP (ref 1.7–9.3)
NEUTROPHILS # BLD AUTO: 2.02 K/UL — SIGNIFICANT CHANGE UP (ref 1.4–6.5)
NEUTROPHILS NFR BLD AUTO: 45.8 % — SIGNIFICANT CHANGE UP (ref 42.2–75.2)
NITRITE UR-MCNC: NEGATIVE — SIGNIFICANT CHANGE UP
NRBC # BLD: 0 /100 WBCS — SIGNIFICANT CHANGE UP (ref 0–0)
PH UR: 6 — SIGNIFICANT CHANGE UP (ref 5–8)
PLATELET # BLD AUTO: 231 K/UL — SIGNIFICANT CHANGE UP (ref 130–400)
POTASSIUM SERPL-MCNC: 4.3 MMOL/L — SIGNIFICANT CHANGE UP (ref 3.5–5)
POTASSIUM SERPL-SCNC: 4.3 MMOL/L — SIGNIFICANT CHANGE UP (ref 3.5–5)
PROT SERPL-MCNC: 6.6 G/DL — SIGNIFICANT CHANGE UP (ref 6–8)
PROT UR-MCNC: SIGNIFICANT CHANGE UP
RBC # BLD: 4.1 M/UL — LOW (ref 4.2–5.4)
RBC # FLD: 13.8 % — SIGNIFICANT CHANGE UP (ref 11.5–14.5)
RBC CASTS # UR COMP ASSIST: 352 /HPF — HIGH (ref 0–4)
SODIUM SERPL-SCNC: 139 MMOL/L — SIGNIFICANT CHANGE UP (ref 135–146)
SP GR SPEC: 1.03 — HIGH (ref 1.01–1.02)
UROBILINOGEN FLD QL: SIGNIFICANT CHANGE UP
WBC # BLD: 4.42 K/UL — LOW (ref 4.8–10.8)
WBC # FLD AUTO: 4.42 K/UL — LOW (ref 4.8–10.8)
WBC UR QL: 4 /HPF — SIGNIFICANT CHANGE UP (ref 0–5)

## 2019-11-13 PROCEDURE — 76830 TRANSVAGINAL US NON-OB: CPT | Mod: 26

## 2019-11-13 PROCEDURE — 99284 EMERGENCY DEPT VISIT MOD MDM: CPT

## 2019-11-13 NOTE — ED ADULT TRIAGE NOTE - CHIEF COMPLAINT QUOTE
LMP 9/12/19, pt had vaginal bleeding and pain today, states she saw the fetal product in the toilet.

## 2019-11-13 NOTE — ED ADULT NURSE NOTE - OBJECTIVE STATEMENT
Pt presented with c/o vaginal bleeding and lower abd pain for 2 days. Pt endorsed she went to the bathroom today and noted fetal product in toilet. States bleeding has now subsided. Denies n/v/fever. Alert and oriented x3. No obvious deformities or trauma noted.

## 2019-11-13 NOTE — ED PROVIDER NOTE - NSFOLLOWUPINSTRUCTIONS_ED_ALL_ED_FT
Miscarriage    A miscarriage is the sudden loss of an unborn baby (fetus) before the 20th week of pregnancy. Most miscarriages happen in the first 3 months of pregnancy. Sometimes, it happens before a woman even knows she is pregnant. A miscarriage is also called a "spontaneous miscarriage" or "early pregnancy loss." Having a miscarriage can be an emotional experience. Talk with your caregiver about any questions you may have about miscarrying, the grieving process, and your future pregnancy plans.    CAUSES  Problems with the fetal chromosomes that make it impossible for the baby to develop normally. Problems with the baby's genes or chromosomes are most often the result of errors that occur, by chance, as the embryo divides and grows. The problems are not inherited from the parents.  Infection of the cervix or uterus.    Hormone problems.     Problems with the cervix, such as having an incompetent cervix. This is when the tissue in the cervix is not strong enough to hold the pregnancy.    Problems with the uterus, such as an abnormally shaped uterus, uterine fibroids, or congenital abnormalities.    Certain medical conditions.    Smoking, drinking alcohol, or taking illegal drugs.    Trauma.       Often, the cause of a miscarriage is unknown.     SYMPTOMS  Vaginal bleeding or spotting, with or without cramps or pain.  Pain or cramping in the abdomen or lower back.  Passing fluid, tissue, or blood clots from the vagina.     DIAGNOSIS  Your caregiver will perform a physical exam. You may also have an ultrasound to confirm the miscarriage. Blood or urine tests may also be ordered.    TREATMENT  Sometimes, treatment is not necessary if you naturally pass all the fetal tissue that was in the uterus. If some of the fetus or placenta remains in the body (incomplete miscarriage), tissue left behind may become infected and must be removed. Usually, a dilation and curettage (D and C) procedure is performed. During a D and C procedure, the cervix is widened (dilated) and any remaining fetal or placental tissue is gently removed from the uterus.   Antibiotic medicines are prescribed if there is an infection. Other medicines may be given to reduce the size of the uterus (contract) if there is a lot of bleeding.   If you have Rh negative blood and your baby was Rh positive, you will need a Rh immunoglobulin shot. This shot will protect any future baby from having Rh blood problems in future pregnancies.     HOME CARE INSTRUCTIONS  Your caregiver may order bed rest or may allow you to continue light activity. Resume activity as directed by your caregiver.   Have someone help with home and family responsibilities during this time.    Keep track of the number of sanitary pads you use each day and how soaked (saturated) they are. Write down this information.    Do not use tampons. Do not douche or have sexual intercourse until approved by your caregiver.    Only take over-the-counter or prescription medicines for pain or discomfort as directed by your caregiver.    Do not take aspirin. Aspirin can cause bleeding.    Keep all follow-up appointments with your caregiver.    If you or your partner have problems with grieving, talk to your caregiver or seek counseling to help cope with the pregnancy loss. Allow enough time to grieve before trying to get pregnant again.       SEEK IMMEDIATE MEDICAL CARE IF:  You have severe cramps or pain in your back or abdomen.  You have a fever.  You pass large blood clots (walnut-sized or larger) or tissue from your vagina. Save any tissue for your caregiver to inspect.     Your bleeding increases.    You have a thick, bad-smelling vaginal discharge.  You become lightheaded, weak, or you faint.    You have chills.      MAKE SURE YOU:  Understand these instructions.  Will watch your condition.  Will get help right away if you are not doing well or get worse.    ADDITIONAL NOTES AND INSTRUCTIONS    Please follow up with your Primary MD in 24-48 hr.  Seek immediate medical care for any new/worsening signs or symptoms.

## 2019-11-13 NOTE — ED PROVIDER NOTE - NSFOLLOWUPCLINICS_GEN_ALL_ED_FT
[facility]  OB/GYN  [address] [place] [address]  Phone: [phone]  Fax:   Follow Up Time:

## 2019-11-13 NOTE — ED PROVIDER NOTE - OBJECTIVE STATEMENT
23 y/o female  LMP 19 presents to the ED c/o "I have vaginal bleeding for about 1 week. This morning I think I had a miscarriage. I passed a large tissue. I have some light bleeding now." no abdominal pain/ fever/ chills/ weakness

## 2019-11-13 NOTE — ED PROVIDER NOTE - ATTENDING CONTRIBUTION TO CARE
I personally evaluated the patient. I reviewed the Resident’s or Physician Assistant’s note (as assigned above), and agree with the findings and plan except as documented in my note.  25 yo woman with vaginal bleeding in early pregnancy.  Has been happening for a week.  Today passed a large amount of tissue.  Workup revealed completed AB.  Rh +.  Stable for discharge and outpatient OB follow up.

## 2019-11-13 NOTE — ED PROVIDER NOTE - PATIENT PORTAL LINK FT
You can access the FollowMyHealth Patient Portal offered by [facility] by registering at the following website: [email or web address]. By joining [employer]’s FollowMyHealth portal, you will also be able to view your health information using other applications (apps) compatible with our system.

## 2019-11-13 NOTE — ED PROVIDER NOTE - CLINICAL SUMMARY MEDICAL DECISION MAKING FREE TEXT BOX
25 yo woman with signs of completed AB.  Otherwise, no complications.  Discharged with outpatient OB follow up recommended.

## 2019-12-22 ENCOUNTER — EMERGENCY (EMERGENCY)
Facility: HOSPITAL | Age: 24
LOS: 0 days | Discharge: HOME | End: 2019-12-22
Admitting: EMERGENCY MEDICINE
Payer: MEDICAID

## 2019-12-22 VITALS
DIASTOLIC BLOOD PRESSURE: 74 MMHG | HEART RATE: 101 BPM | WEIGHT: 134.92 LBS | TEMPERATURE: 98 F | RESPIRATION RATE: 18 BRPM | OXYGEN SATURATION: 99 % | SYSTOLIC BLOOD PRESSURE: 140 MMHG

## 2019-12-22 DIAGNOSIS — K08.89 OTHER SPECIFIED DISORDERS OF TEETH AND SUPPORTING STRUCTURES: ICD-10-CM

## 2019-12-22 PROCEDURE — 99283 EMERGENCY DEPT VISIT LOW MDM: CPT

## 2019-12-22 RX ORDER — KETOROLAC TROMETHAMINE 30 MG/ML
60 SYRINGE (ML) INJECTION ONCE
Refills: 0 | Status: DISCONTINUED | OUTPATIENT
Start: 2019-12-22 | End: 2019-12-22

## 2019-12-22 RX ADMIN — Medication 60 MILLIGRAM(S): at 12:42

## 2019-12-22 NOTE — ED PROVIDER NOTE - PATIENT PORTAL LINK FT
You can access the FollowMyHealth Patient Portal offered by [facility] by registering at the following website: [email or web address]. By joining [employer]’s FollowMyHealth portal, you will also be able to view your health information using other applications (apps) compatible with our system.

## 2019-12-22 NOTE — ED PROVIDER NOTE - NSFOLLOWUPCLINICS_GEN_ALL_ED_FT
[facility]  Dental  [address] [place] [id number]  Phone: [phone]  Fax:   Follow Up Time:

## 2019-12-22 NOTE — ED PROVIDER NOTE - OBJECTIVE STATEMENT
24 year old F c/o dental pain to tooth #28 x 1 month. Sts needed root canal over area but does not have insurance. Denies any abscess formation, fever/chills, headache, ear pain, nausea, vomiting, neck pain, difficulty swallowing, hoarseness, voice changes. 24 year old F c/o dental pain to tooth #28 x 1 month. Sts needed root canal over area but does not have insurance. Denies any abscess formation, fever/chills, headache, ear pain, nausea, vomiting, neck pain, difficulty swallowing, hoarseness, voice changes. Pt on Amoxicillin

## 2019-12-22 NOTE — ED PROVIDER NOTE - NS ED ROS FT
Constitutional: no fever, chills, no recent weight loss, change in appetite or malaise  Eyes: no redness/discharge/pain/vision changes  ENT: + dental pain over tooth #28.  no rhinorrhea/ear pain/sore throat  Cardiac: No chest pain, SOB or edema.  Respiratory: No cough or respiratory distress  MS: no pain to back or extremities, no loss of ROM, no weakness  Neuro: No headache or weakness. No LOC.  Skin: No skin rash.  Except as documented in the HPI, all other systems are negative.

## 2019-12-22 NOTE — ED ADULT NURSE NOTE - OBJECTIVE STATEMENT
pt comes In with complaints of right lower dental pain for 1 month. took 8,000mg of tylenol yesterday. cleared by md kennedy to come to urgent care.

## 2019-12-22 NOTE — ED PROVIDER NOTE - PHYSICAL EXAMINATION
CONSTITUTIONAL: Well-appearing; well-nourished; in no apparent distress.   EYES: PERRL; EOM intact.   ENT: + poor dentition. Tooth #28 is decayed with ttp. No gingival swelling, abscess formation. normal nose; no rhinorrhea; normal pharynx with no tonsillar hypertrophy.   NECK: Supple; non-tender; no cervical lymphadenopathy.   CARDIOVASCULAR: Normal S1, S2; no murmurs, rubs, or gallops.   RESPIRATORY: Normal chest excursion with respiration; breath sounds clear and equal bilaterally; no wheezes, rhonchi, or rales.  MS: No evidence of trauma or deformity. Normal ROM in all four extremities; non-tender to palpation; distal pulses are normal.   SKIN: Normal for age and race; warm; dry; good turgor; no apparent lesions or exudate.   NEURO/PSYCH: A & O x 4; grossly unremarkable. mood and manner are appropriate.

## 2019-12-22 NOTE — ED ADULT TRIAGE NOTE - CHIEF COMPLAINT QUOTE
right lower dental pain (tooth #28) x 1 month. Pt states she took 8,000 mg tylenol yesterday throughout the day.

## 2020-08-23 ENCOUNTER — EMERGENCY (EMERGENCY)
Facility: HOSPITAL | Age: 25
LOS: 0 days | Discharge: HOME | End: 2020-08-23
Attending: EMERGENCY MEDICINE | Admitting: EMERGENCY MEDICINE
Payer: MEDICAID

## 2020-08-23 VITALS
TEMPERATURE: 98 F | WEIGHT: 139.99 LBS | DIASTOLIC BLOOD PRESSURE: 674 MMHG | RESPIRATION RATE: 18 BRPM | SYSTOLIC BLOOD PRESSURE: 118 MMHG | HEART RATE: 100 BPM | OXYGEN SATURATION: 99 %

## 2020-08-23 DIAGNOSIS — Z79.899 OTHER LONG TERM (CURRENT) DRUG THERAPY: ICD-10-CM

## 2020-08-23 DIAGNOSIS — K08.89 OTHER SPECIFIED DISORDERS OF TEETH AND SUPPORTING STRUCTURES: ICD-10-CM

## 2020-08-23 PROCEDURE — 99284 EMERGENCY DEPT VISIT MOD MDM: CPT

## 2020-08-23 RX ORDER — KETOROLAC TROMETHAMINE 30 MG/ML
15 SYRINGE (ML) INJECTION ONCE
Refills: 0 | Status: DISCONTINUED | OUTPATIENT
Start: 2020-08-23 | End: 2020-08-23

## 2020-08-23 RX ORDER — AMOXICILLIN 250 MG/5ML
1 SUSPENSION, RECONSTITUTED, ORAL (ML) ORAL
Qty: 21 | Refills: 0
Start: 2020-08-23 | End: 2020-08-29

## 2020-08-23 RX ADMIN — Medication 15 MILLIGRAM(S): at 14:53

## 2020-08-23 NOTE — ED PROVIDER NOTE - ATTENDING CONTRIBUTION TO CARE
25yoF prev healthy presents with L upper tooth approx #16 pain and cracking, states identical past episode 2/2 dental infxn. Denies fever, difficulty swallowing or talking, and all other symptoms. On exam, afebrile, hemodynamically stable, saturating well, NAD, well appearing, head NCAT, EOMI, anicteric, MMM, tooth #16 TTP, no surrounding erythema or facial asymmetry, breathing comfortably on RA, AAO, CN's 3-12 grossly intact, NICHOLAS spontaneously, skin warm, well perfused. No e/o facial abscess, airway compromise, or systemic infxn. No CN deficits. Identical to past presentation. Given abx, analgesia. Patient is well appearing, NAD, afebrile, hemodynamically stable. Discharged with instructions in further symptomatic care, return precautions, and need for dental f/u.

## 2020-08-23 NOTE — ED PROVIDER NOTE - PHYSICAL EXAMINATION
CONSTITUTIONAL: Well-developed; well-nourished; in no acute distress, nontoxic appearing  SKIN: skin exam is warm and dry,  HEAD: Normocephalic; atraumatic.  EYES: PERRL. conjunctiva and sclera clear.  ENT: +TTP overlying tooth #16. no surrounding erythema, induration, fluctuance. MMM, oropharynx non-erythematous. uvula midline  NECK: Normal ROM   EXT: Normal ROM.   NEURO: awake, alert, following commands, oriented, grossly unremarkable. No Focal deficits. GCS 15.   PSYCH: Cooperative, appropriate.

## 2020-08-23 NOTE — ED PROVIDER NOTE - NS ED ROS FT
Review of Systems:  	•	CONSTITUTIONAL: no fever, no diaphoresis, no chills  	•	SKIN: no rash  	•	EYES: no eye pain, no blurry vision  	•	ENT: +L upper tooth pain. no bleeding. no change in hearing, no sore throat, no ear pain or tinnitus  	•	RESPIRATORY: no shortness of breath, no cough  	•	CARDIAC: no chest pain, no palpitations  	•	MUSCULOSKELETAL: no neck pain  	•	NEUROLOGIC: no weakness, no headache, no paresthesias, no LOC

## 2020-08-23 NOTE — ED PROVIDER NOTE - OBJECTIVE STATEMENT
25 year old female, no past medical history, who presents with L upper tooth pain x1 week. patient reports gradual worsening of symptoms. No fever, chills, difficulty swallowing, sore throat, drooling. patient has been taking motrin with moderate improvement.

## 2020-08-23 NOTE — ED PROVIDER NOTE - NSFOLLOWUPINSTRUCTIONS_ED_ALL_ED_FT
Please follow up in dental clinic in 1-3 days  Please be aware of any new or worsening signs or symptoms that should prompt your return to the ER.      Toothache    WHAT YOU NEED TO KNOW:    A toothache is pain that is caused by irritation of the nerves in the center of your tooth. The irritation may be caused by several problems, such as a cavity, an infection, a cracked tooth, or gum disease. Tooth Anatomy         DISCHARGE INSTRUCTIONS:    Return to the emergency department if:     You have trouble breathing or swallowing.       You have swelling in your face or neck.     Contact your dentist if:     You have a fever and chills.       You have trouble opening or closing your mouth.       You have swelling around your tooth.       You have questions or concerns about your condition or care.    Medicines: You may need any of the following:     NSAIDs, such as ibuprofen, help decrease swelling, pain, and fever. This medicine is available with or without a doctor's order. NSAIDs can cause stomach bleeding or kidney problems in certain people. If you take blood thinner medicine, always ask if NSAIDs are safe for you. Always read the medicine label and follow directions. Do not give these medicines to children under 6 months of age without direction from your child's healthcare provider.      Acetaminophen decreases pain and fever. It is available without a doctor's order. Ask how much to take and how often to take it. Follow directions. Acetaminophen can cause liver damage if not taken correctly.      Prescription pain medicine may be given. Ask your healthcare provider how to take this medicine safely. Some prescription pain medicines contain acetaminophen. Do not take other medicines that contain acetaminophen without talking to your healthcare provider. Too much acetaminophen may cause liver damage. Prescription pain medicine may cause constipation. Ask your healthcare provider how to prevent or treat constipation.       Antibiotics help treat or prevent a bacterial infection.       Take your medicine as directed. Contact your healthcare provider if you think your medicine is not helping or if you have side effects. Tell him of her if you are allergic to any medicine. Keep a list of the medicines, vitamins, and herbs you take. Include the amounts, and when and why you take them. Bring the list or the pill bottles to follow-up visits. Carry your medicine list with you in case of an emergency.    Self-care:     Rinse your mouth with warm salt water 4 times a day or as directed.       Eat soft foods to help relieve pain caused by chewing.       Apply ice on your jaw or cheek for 15 to 20 minutes every hour or as directed. Use an ice pack, or put crushed ice in a plastic bag. Cover it with a towel before you apply it. Ice helps prevent tissue damage and decreases swelling and pain.    Help prevent a toothache:     Brush your teeth at least 2 times a day.      Use dental floss to clean between your teeth at least 1 time a day.      See your dentist regularly every 6 months for dental cleanings and oral exams.    Follow up with your dentist as directed: You may be referred to a dental surgeon. Write down your questions so you remember to ask them during your visits.        © Copyright [employer] 2019 All illustrations and images included in CareNotes are the copyrighted property of [employer] or [employer].

## 2020-08-23 NOTE — ED PROVIDER NOTE - PATIENT PORTAL LINK FT
You can access the FollowMyHealth Patient Portal offered by [facility] by registering at the following website: [email or web address]. By joining [email or web address]’s FollowMyHealth portal, you will also be able to view your health information using other applications (apps) compatible with our system.

## 2021-02-23 NOTE — ED ADULT NURSE NOTE - CAS TRG GENERAL AIRWAY, MLM
Patent Suturegard Intro: Intraoperative tissue expansion was performed, utilizing the SUTUREGARD device, in order to reduce wound tension.

## 2021-06-06 ENCOUNTER — EMERGENCY (EMERGENCY)
Facility: HOSPITAL | Age: 26
LOS: 0 days | Discharge: HOME | End: 2021-06-06
Attending: EMERGENCY MEDICINE | Admitting: EMERGENCY MEDICINE
Payer: MEDICAID

## 2021-06-06 VITALS
WEIGHT: 134.92 LBS | TEMPERATURE: 98 F | HEIGHT: 61 IN | RESPIRATION RATE: 16 BRPM | SYSTOLIC BLOOD PRESSURE: 124 MMHG | DIASTOLIC BLOOD PRESSURE: 71 MMHG | OXYGEN SATURATION: 97 % | HEART RATE: 108 BPM

## 2021-06-06 DIAGNOSIS — S03.42XA: ICD-10-CM
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
## 2021-06-06 DIAGNOSIS — R68.84 JAW PAIN: ICD-10-CM

## 2021-06-06 DIAGNOSIS — F17.200 NICOTINE DEPENDENCE, UNSPECIFIED, UNCOMPLICATED: ICD-10-CM
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
## 2021-06-06 PROCEDURE — 99284 EMERGENCY DEPT VISIT MOD MDM: CPT

## 2021-06-06 RX ORDER — KETOROLAC TROMETHAMINE 30 MG/ML
30 SYRINGE (ML) INJECTION ONCE
Refills: 0 | Status: DISCONTINUED | OUTPATIENT
Start: 2021-06-06 | End: 2021-06-06

## 2021-06-06 RX ADMIN — Medication 30 MILLIGRAM(S): at 12:38

## 2021-06-06 NOTE — ED PROVIDER NOTE - OBJECTIVE STATEMENT
25 y/o F, no significant PMHx, presents to the ED  with complaints of left upper jaw discomfort x two weeks. Patient believes she may have TMJ as she has had similar symptoms in the past. She states that her pain recently began after she had a dental extraction performed on right side. She denies any cephalgia, fever and chills.

## 2021-06-06 NOTE — ED PROVIDER NOTE - PATIENT PORTAL LINK FT
You can access the FollowMyHealth Patient Portal offered by [facility] by registering at the following website: [email or web address]. By joining [employer]’s FollowMyHealth portal, you will also be able to view your health information using other applications (apps) compatible with our system.

## 2021-06-06 NOTE — ED PROVIDER NOTE - NSFOLLOWUPINSTRUCTIONS_ED_ALL_ED_FT
TEMPOROMANDIBULAR DISORDER - Discharge Care     Temporomandibular Disorder    WHAT YOU NEED TO KNOW:    Temporomandibular disorder is a condition that causes pain in your jaw. The disorder affects the joint between your temporal bone and your mandible (jawbone). The muscles and nerves around the joint are also affected.     Jaw       DISCHARGE INSTRUCTIONS:    Medicines:   •Pain medicine: You may be given a prescription medicine to decrease pain. Do not wait until the pain is severe before you take this medicine.      •NSAIDs: These medicines decrease swelling and pain. You can buy NSAIDs without a doctor's order. Ask your healthcare provider which medicine is right for you, and how much to take. Take as directed. NSAIDs can cause stomach bleeding or kidney problems if not taken correctly.      •Muscle relaxers help decrease pain and muscle spasms.      •Take your medicine as directed. Contact your healthcare provider if you think your medicine is not helping or if you have side effects. Tell him or her if you are allergic to any medicine. Keep a list of the medicines, vitamins, and herbs you take. Include the amounts, and when and why you take them. Bring the list or the pill bottles to follow-up visits. Carry your medicine list with you in case of an emergency.      Follow up with your healthcare provider as directed: Write down your questions so you remember to ask them during your visits.     Manage your symptoms:   •Eat soft foods: Your healthcare provider may suggest that you eat only soft foods for several days. A dietitian may work with you to find foods that are easier to bite, chew, or swallow. Examples are soup, applesauce, cottage cheese, pudding, yogurt, and soft fruits.       •Use jaw supporting devices: Splints may be used to support your jaw or keep it from moving. You may need to wear a mouth guard to keep you from clenching or grinding your teeth while you are sleeping.      •Use ice and heat: Ice helps decrease swelling and pain. Ice may also help prevent tissue damage. Use an ice pack, or put crushed ice in a plastic bag. Cover it with a towel and place it on your jaw for 15 to 20 minutes every hour or as directed. After the first 24 to 48 hours, use heat to decrease pain, swelling, and muscle spasms. Apply heat on the area for 20 to 30 minutes every 2 hours for as many days as directed. Use a heating pad, moist warm compress, or a hot water bottle.       •Go to physical therapy: A physical therapist teaches you exercises to help improve movement and strength, and to decrease pain in your jaw. A speech therapist may also help you with swallowing and speech exercises.      Contact your healthcare provider if:   •You have a fever.      •Your splint or mouth guard is loose.      •You have questions or concerns about your condition or care.      Seek care immediately or call 911 if:   •You have nausea, are vomiting, or cannot keep liquids down.      •You have pain that does not go away even after you take your pain medicine.      •You have problems breathing, talking, drinking, eating, or swallowing.      •Your splint or mouth guard gets damaged or broken.

## 2021-06-06 NOTE — ED ADULT NURSE NOTE - OBJECTIVE STATEMENT
Pt presented with c/o L jaw pain. Endorses hx TMJ. States she was taking motrin at home with no relief.

## 2021-06-06 NOTE — ED PROVIDER NOTE - CLINICAL SUMMARY MEDICAL DECISION MAKING FREE TEXT BOX
27 yo female c/o left sided jaw pain for 2 weeks,  She had multiple teeth extracted from the right side 2 and a half weeks prior.  Pain radiated to her left ear, worse with chewing; she has tried Tylenol and Motrin for it.  Denies any sore throat, fever, chills neck pain or any other associated complaints,  Reports similar problem on the right a few years ago.  Well-appearing young female in NAD, nml ear exam b/l, no palpable dental abscess, nml floor of the mouth, several missing teeth and few dental caries, + loud click and ttp at the left TMJ.  Imp; left TMJ dysfunction, analgesia given, advised to follow up with her dentist. Strict return precautions given.

## 2021-06-15 ENCOUNTER — EMERGENCY (EMERGENCY)
Facility: HOSPITAL | Age: 26
LOS: 0 days | Discharge: HOME | End: 2021-06-15
Attending: STUDENT IN AN ORGANIZED HEALTH CARE EDUCATION/TRAINING PROGRAM | Admitting: STUDENT IN AN ORGANIZED HEALTH CARE EDUCATION/TRAINING PROGRAM
Payer: MEDICAID

## 2021-06-15 VITALS
HEIGHT: 61 IN | HEART RATE: 76 BPM | OXYGEN SATURATION: 98 % | SYSTOLIC BLOOD PRESSURE: 115 MMHG | RESPIRATION RATE: 17 BRPM | DIASTOLIC BLOOD PRESSURE: 69 MMHG | TEMPERATURE: 98 F

## 2021-06-15 DIAGNOSIS — K08.89 OTHER SPECIFIED DISORDERS OF TEETH AND SUPPORTING STRUCTURES: ICD-10-CM

## 2021-06-15 PROCEDURE — 99284 EMERGENCY DEPT VISIT MOD MDM: CPT

## 2021-06-15 RX ORDER — KETOROLAC TROMETHAMINE 30 MG/ML
30 SYRINGE (ML) INJECTION ONCE
Refills: 0 | Status: DISCONTINUED | OUTPATIENT
Start: 2021-06-15 | End: 2021-06-15

## 2021-06-15 RX ADMIN — Medication 30 MILLIGRAM(S): at 19:04

## 2021-06-15 NOTE — ED PROVIDER NOTE - ATTENDING CONTRIBUTION TO CARE
25 yo f pt presents for L lower dental pain. sx for 2 weeks. pt on abx. no f/c. pt tolerating PO. no facial swelling or redness.    vss  gen- NAD, aaox3  HENT- oropharynx clear, no drooling/stridor, tolerating secretions, normal voice, normal base of tongue, no facial swelling  Dental- TTP to tooth #19, no periapical abscess.   card-rrr  lungs-no resp distress, CTAB      will give nsaid, rec cont abx, dental tomorrow when clinic open  no resp distress

## 2021-06-15 NOTE — ED PROVIDER NOTE - CLINICAL SUMMARY MEDICAL DECISION MAKING FREE TEXT BOX
dentalgia, rec dental clinic tomorrow   no airway complaints, pt nontoxic appearing w/o systemic complaints. pt tolerating secretions

## 2021-06-15 NOTE — ED PROVIDER NOTE - PATIENT PORTAL LINK FT
You can access the FollowMyHealth Patient Portal offered by [facility] by registering at the following website: [email or web address]. By joining [employer]’s FollowMyHealth portal, you will also be able to view your health information using other applications (apps) compatible with our system.

## 2021-06-15 NOTE — ED PROVIDER NOTE - OBJECTIVE STATEMENT
27 yo female, no pmh, presents to ed for dental pain. started 10 days ago, left lower, s/p abx course, mild, aching, no radiation. denies fever, chills, numbness, tingling

## 2021-06-15 NOTE — ED PROVIDER NOTE - PHYSICAL EXAMINATION
Physical Exam    Vital Signs: I have reviewed the initial vital signs.  Constitutional: well-nourished, appears stated age, no acute distress  Eyes: Conjunctiva pink, Sclera clear,   ENT: OP is clear without exudates, ttp to tooth 19, normal gingival, tongue without swelling,  Musculoskeletal: supple neck, no lower extremity edema, no midline tenderness  Integumentary: warm, dry, no rash  Neurologic: awake, alert, nvi

## 2021-06-15 NOTE — ED PROVIDER NOTE - NSFOLLOWUPCLINICS_GEN_ALL_ED_FT
[facility]  Dental  [address] [place] [id number]  Phone: [phone]  Fax:   Follow Up Time: 1-3 Days

## 2021-09-22 ENCOUNTER — EMERGENCY (EMERGENCY)
Facility: HOSPITAL | Age: 26
LOS: 0 days | Discharge: HOME | End: 2021-09-22
Attending: EMERGENCY MEDICINE | Admitting: EMERGENCY MEDICINE
Payer: MEDICAID

## 2021-09-22 VITALS
TEMPERATURE: 98 F | OXYGEN SATURATION: 98 % | RESPIRATION RATE: 17 BRPM | SYSTOLIC BLOOD PRESSURE: 114 MMHG | DIASTOLIC BLOOD PRESSURE: 55 MMHG | HEART RATE: 95 BPM

## 2021-09-22 VITALS — HEIGHT: 61 IN

## 2021-09-22 DIAGNOSIS — F84.0 AUTISTIC DISORDER: ICD-10-CM

## 2021-09-22 DIAGNOSIS — Z77.120 CONTACT WITH AND (SUSPECTED) EXPOSURE TO MOLD (TOXIC): ICD-10-CM

## 2021-09-22 DIAGNOSIS — R05 COUGH: ICD-10-CM

## 2021-09-22 DIAGNOSIS — Q21.1 ATRIAL SEPTAL DEFECT: ICD-10-CM

## 2021-09-22 DIAGNOSIS — F17.200 NICOTINE DEPENDENCE, UNSPECIFIED, UNCOMPLICATED: ICD-10-CM

## 2021-09-22 PROCEDURE — 99283 EMERGENCY DEPT VISIT LOW MDM: CPT

## 2021-09-22 NOTE — ED PROVIDER NOTE - CONSTITUTIONAL, MLM
normal... Poor eye contact, well appearing, awake, alert, oriented to person, place, time/situation and in no apparent distress.

## 2021-09-22 NOTE — ED PROVIDER NOTE - ATTENDING CONTRIBUTION TO CARE
27 yo f with pmh of autism, presents with request for mold exposure.  pt says she has had a cough for several days.  no fever, no chills, no nasal congestion.  pt says she noted mold in her HVAC system x 1 yr.  exam: nad, ncat, perrl, eomi, mmm, rrr, ctab, abd soft, nt,nd aox3, imp: pt advised to f/u with ID or pulm for any mold exposure testing.

## 2021-09-22 NOTE — ED PROVIDER NOTE - OBJECTIVE STATEMENT
26 y.o. female with a PMH of ASD presented to the ER c/o mild cough for the past several days.  Pt denies fever, chills, chest pain, dizziness, SOB.  (+) concern for mold exposure.  Pt also asking for referral for a specialist that can "formally diagnose" her ASD.  No other complaints.

## 2021-09-22 NOTE — ED PROVIDER NOTE - PROVIDER TOKENS
PROVIDER:[TOKEN:[[id number]],FOLLOWUP:[1-3 Days]],PROVIDER:[TOKEN:[[id number]],FOLLOWUP:[1-3 Days]]

## 2021-09-22 NOTE — ED PROVIDER NOTE - CARE PROVIDER_API CALL
Jackson Norman (DO)  Critical Care Medicine; Pulmonary Disease; Sleep Medicine  [address] [place] [address], [address]  [place] [address]  Phone: [phone]  Fax: [phone]  Follow Up Time: 1-3 Days    Chaz Solano (DO)  Infectious Disease; Internal Medicine  [address] [place] [address]  Phone: [phone]  Fax: [phone]  Follow Up Time: 1-3 Days

## 2021-09-22 NOTE — ED PROVIDER NOTE - NSFOLLOWUPINSTRUCTIONS_ED_ALL_ED_FT
Cough, Adult     Coughing is a reflex that clears your throat and your airways. Coughing helps to heal and protect your lungs. It is normal to cough occasionally, but a cough that happens with other symptoms or lasts a long time may be a sign of a condition that needs treatment. A cough may last only 2–3 weeks (acute), or it may last longer than 8 weeks (chronic).  What are the causes?  Coughing is commonly caused by:  Breathing in substances that irritate your lungs.A viral or bacterial respiratory infection.Allergies.Asthma.Postnasal drip.Smoking.Acid backing up from the stomach into the esophagus (gastroesophageal reflux).Certain medicines.Chronic lung problems, including COPD (or rarely, lung cancer).Other medical conditions such as heart failure.Follow these instructions at home:  Pay attention to any changes in your symptoms. Take these actions to help with your discomfort:  Take medicines only as told by your health care provider.  If you were prescribed an antibiotic medicine, take it as told by your health care provider. Do not stop taking the antibiotic even if you start to feel better.Talk with your health care provider before you take a cough suppressant medicine.Drink enough fluid to keep your urine clear or pale yellow.If the air is dry, use a cold steam vaporizer or humidifier in your bedroom or your home to help loosen secretions.Avoid anything that causes you to cough at work or at home.If your cough is worse at night, try sleeping in a semi-upright position.Avoid cigarette smoke. If you smoke, quit smoking. If you need help quitting, ask your health care provider.Avoid caffeine.Avoid alcohol.Rest as needed.Contact a health care provider if:  You have new symptoms.You cough up pus.Your cough does not get better after 2–3 weeks, or your cough gets worse.You cannot control your cough with suppressant medicines and you are losing sleep.You develop pain that is getting worse or pain that is not controlled with pain medicines.You have a fever.You have unexplained weight loss.You have night sweats.Get help right away if:  You cough up blood.You have difficulty breathing.Your heartbeat is very fast.This information is not intended to replace advice given to you by your health care provider. Make sure you discuss any questions you have with your health care provider.    Document Released: 06/15/2012 Document Revised: 05/25/2017 Document Reviewed: 02/24/2016  [employer] Interactive Patient Education © 2019 [employer] Inc[employer]

## 2021-09-22 NOTE — ED PROVIDER NOTE - PATIENT PORTAL LINK FT
You can access the FollowMyHealth Patient Portal offered by [facility] by registering at the following website: [email or web address]. By joining [employer]’s FollowMyHealth portal, you will also be able to view your health information using other applications (apps) compatible with our system.

## 2021-09-27 ENCOUNTER — EMERGENCY (EMERGENCY)
Facility: HOSPITAL | Age: 26
LOS: 0 days | Discharge: HOME | End: 2021-09-27
Attending: EMERGENCY MEDICINE | Admitting: EMERGENCY MEDICINE
Payer: MEDICAID

## 2021-09-27 VITALS
DIASTOLIC BLOOD PRESSURE: 55 MMHG | OXYGEN SATURATION: 98 % | TEMPERATURE: 98 F | HEIGHT: 61 IN | SYSTOLIC BLOOD PRESSURE: 127 MMHG | WEIGHT: 149.91 LBS | HEART RATE: 90 BPM | RESPIRATION RATE: 18 BRPM
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
## 2021-09-27 DIAGNOSIS — M79.605 PAIN IN LEFT LEG: ICD-10-CM

## 2021-09-27 DIAGNOSIS — S80.812A ABRASION, LEFT LOWER LEG, INITIAL ENCOUNTER: ICD-10-CM
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
## 2021-09-27 DIAGNOSIS — F17.200 NICOTINE DEPENDENCE, UNSPECIFIED, UNCOMPLICATED: ICD-10-CM

## 2021-09-27 PROCEDURE — 99284 EMERGENCY DEPT VISIT MOD MDM: CPT

## 2021-09-27 PROCEDURE — 73590 X-RAY EXAM OF LOWER LEG: CPT | Mod: 26,LT

## 2021-09-27 PROCEDURE — 73610 X-RAY EXAM OF ANKLE: CPT | Mod: 26,LT

## 2021-09-27 RX ORDER — TETANUS TOXOID, REDUCED DIPHTHERIA TOXOID AND ACELLULAR PERTUSSIS VACCINE, ADSORBED 5; 2.5; 8; 8; 2.5 [IU]/.5ML; [IU]/.5ML; UG/.5ML; UG/.5ML; UG/.5ML
0.5 SUSPENSION INTRAMUSCULAR ONCE
Refills: 0 | Status: DISCONTINUED | OUTPATIENT
Start: 2021-09-27 | End: 2021-09-27

## 2021-09-27 NOTE — ED PROVIDER NOTE - NS ED ROS FT
Consitutional: No fever, chills  Cardiac:  No chest pain, SOB  Respiratory:  No cough, hemoptysis  GI:  No abdominal pain, nausea, vomiting  :  No dysuria  MS:  No myalgia, muscle weakness, joint pain or back pain +leg pain  Neuro:  No headache or weakness.  No LOC.  Skin:  +skin lac

## 2021-09-27 NOTE — ED PROVIDER NOTE - PHYSICAL EXAMINATION
CONSTITUTIONAL: Well-developed; well-nourished; in no acute distress.   SKIN: warm, dry  HEAD: Normocephalic; atraumatic.  NECK: Supple; non tender  CARD:  Regular rate and rhythm.   RESP: No wheezes, rales or rhonchi.  EXT: Normal ROM.  No clubbing, cyanosis or edema. left anterior leg 3mm laceration  NEURO: Alert, oriented, grossly unremarkable  PSYCH: Cooperative, appropriate

## 2021-09-27 NOTE — ED PROVIDER NOTE - CARE PROVIDER_API CALL
Katy Obando)  Orthopaedic Surgery  [address] [place] [address]  Phone: [phone]  Fax: [phone]  Follow Up Time: 7-10 Days

## 2021-09-27 NOTE — ED PROVIDER NOTE - OBJECTIVE STATEMENT
27yo w/o pmhx who present with left anterior leg pain. she had mechanical fall yesterday and her anterior leg landed on shopping cart. no HT, LOC, or AC use. denies numbness weakness paresthesia. came in today because she was bringing her child in for evaluation for a separate issue so wanted to get leg evaluated while here.

## 2021-09-27 NOTE — ED PROVIDER NOTE - CLINICAL SUMMARY MEDICAL DECISION MAKING FREE TEXT BOX
26 female here for leg abrasion. Had imaging, no acute findings, will discharge with outpatient management.

## 2021-09-27 NOTE — ED ADULT NURSE NOTE - CAS TRG GEN SKIN CONDITION
"Oncology Rooming Note    January 3, 2019 1:34 PM   Chilo Oneil is a 66 year old male who presents for:    Chief Complaint   Patient presents with     Oncology Clinic Visit     3 month follow up malignant neoplasm of lower lobe of left lung.      Initial Vitals: /67 (BP Location: Right arm, Patient Position: Sitting, Cuff Size: Adult Regular)   Pulse 83   Temp 98.4  F (36.9  C) (Tympanic)   Resp 20   Ht 1.892 m (6' 2.5\")   Wt 68.5 kg (151 lb)   SpO2 93%   BMI 19.13 kg/m   Estimated body mass index is 19.13 kg/m  as calculated from the following:    Height as of this encounter: 1.892 m (6' 2.5\").    Weight as of this encounter: 68.5 kg (151 lb). Body surface area is 1.9 meters squared.  Severe Pain (7) Comment: Data Unavailable   No LMP for male patient.  Allergies reviewed: Yes  Medications reviewed: Yes    Medications: Medication refills not needed today.  Pharmacy name entered into [facility]:    [facility] [place], [place] - [address] - [place], [place] - [address] [place] [address]    Clinical concerns: 3 month follow up malignant neoplasm of lower lobe of left lung.     8 minutes for nursing intake (face to face time)     Dorothy Hart [facility]            " Warm/Dry

## 2021-09-27 NOTE — ED PROVIDER NOTE - PATIENT PORTAL LINK FT
You can access the FollowMyHealth Patient Portal offered by [facility] by registering at the following website: [email or web address]. By joining [employer]’s FollowMyHealth portal, you will also be able to view your health information using other applications (apps) compatible with our system.

## 2021-09-27 NOTE — ED PROVIDER NOTE - ATTENDING CONTRIBUTION TO CARE
I personally evaluated the patient. I reviewed the Resident’s or Physician Assistant’s note (as assigned above), and agree with the findings and plan except as documented in my note.     26 female here for left anterior leg pain after being hit by a shopping cart yesterday.     ROS otherwise unremarkable    PE: female in no distress. CV: pulses intact. CHEST: normal work of breathing. ABD: nondistended. SKIN: normal. anterior left leg with mild abrasion noted EXT: FROM. no bony deformities. no ecchymoses. NEURO: AAO 3 no focal deficits.    Impression: leg pain, abrasion     Plan: imaging supportive care and reevaluation

## 2021-09-27 NOTE — ED ADULT TRIAGE NOTE - CHIEF COMPLAINT QUOTE
laceration and abrasion to left lower leg after walking with shopping cart that got stuck in a crack and fell forward. denies head injury/LOC

## 2022-03-14 ENCOUNTER — EMERGENCY (EMERGENCY)
Facility: HOSPITAL | Age: 27
LOS: 0 days | Discharge: HOME | End: 2022-03-14
Attending: EMERGENCY MEDICINE | Admitting: EMERGENCY MEDICINE
Payer: MEDICAID

## 2022-03-14 VITALS
HEART RATE: 108 BPM | SYSTOLIC BLOOD PRESSURE: 115 MMHG | DIASTOLIC BLOOD PRESSURE: 65 MMHG | TEMPERATURE: 98 F | HEIGHT: 61 IN | OXYGEN SATURATION: 98 % | WEIGHT: 134.92 LBS | RESPIRATION RATE: 18 BRPM

## 2022-03-14 VITALS — HEART RATE: 98 BPM

## 2022-03-14 DIAGNOSIS — R09.81 NASAL CONGESTION: ICD-10-CM

## 2022-03-14 DIAGNOSIS — J02.9 ACUTE PHARYNGITIS, UNSPECIFIED: ICD-10-CM

## 2022-03-14 DIAGNOSIS — J32.9 CHRONIC SINUSITIS, UNSPECIFIED: ICD-10-CM

## 2022-03-14 DIAGNOSIS — R05.9 COUGH, UNSPECIFIED: ICD-10-CM

## 2022-03-14 DIAGNOSIS — R51.9 HEADACHE, UNSPECIFIED: ICD-10-CM

## 2022-03-14 DIAGNOSIS — F17.200 NICOTINE DEPENDENCE, UNSPECIFIED, UNCOMPLICATED: ICD-10-CM

## 2022-03-14 LAB — SARS-COV-2 RNA SPEC QL NAA+PROBE: SIGNIFICANT CHANGE UP

## 2022-03-14 PROCEDURE — 99284 EMERGENCY DEPT VISIT MOD MDM: CPT

## 2022-03-14 RX ORDER — AMOXICILLIN 250 MG/5ML
1 SUSPENSION, RECONSTITUTED, ORAL (ML) ORAL
Qty: 21 | Refills: 0
Start: 2022-03-14 | End: 2022-03-20

## 2022-03-14 NOTE — ED PROVIDER NOTE - PATIENT PORTAL LINK FT
You can access the FollowMyHealth Patient Portal offered by [facility] by registering at the following website: [email or web address]. By joining [email or web address]’s FollowMyHealth portal, you will also be able to view your health information using other applications (apps) compatible with our system.

## 2022-03-14 NOTE — ED PROVIDER NOTE - ATTENDING CONTRIBUTION TO CARE
21-year-old female past medical history of autism and is a smoker presents with URI symptoms for the past 2 weeks.  Patient with nasal congestion sore throat, runny nose.  No fevers no chills.  On exam pharynx clear, lungs clear.  Patient with nasal congestion.  Patient requested antibiotics given length of symptoms.  Patient given prescription for amoxicillin.  Patient discharged with outpatient follow-up.  Patient nontoxic, well-appearing

## 2022-03-14 NOTE — ED PROVIDER NOTE - OBJECTIVE STATEMENT
25 y/o female with hx Autism, smoker presents to the ED c/o "I have runny nose, congestion, HA, cough, sore throat for 2 weeks." no fever/ chills I have one covid vaccine so far.

## 2022-04-08 NOTE — ED ADULT NURSE NOTE - NSIMPLEMENTINTERV_GEN_ALL_ED
Implemented All Universal Safety Interventions:  [place] to call system. Call bell, personal items and telephone within reach. Instruct patient to call for assistance. Room bathroom lighting operational. Non-slip footwear when patient is off stretcher. Physically safe environment: no spills, clutter or unnecessary equipment. Stretcher in lowest position, wheels locked, appropriate side rails in place. Solaraze Counseling:  I discussed with the patient the risks of Solaraze including but not limited to erythema, scaling, itching, weeping, crusting, and pain.

## 2022-05-01 ENCOUNTER — EMERGENCY (EMERGENCY)
Facility: HOSPITAL | Age: 27
LOS: 0 days | Discharge: HOME | End: 2022-05-01
Attending: EMERGENCY MEDICINE | Admitting: EMERGENCY MEDICINE
Payer: MEDICAID

## 2022-05-01 VITALS
RESPIRATION RATE: 20 BRPM | SYSTOLIC BLOOD PRESSURE: 113 MMHG | HEIGHT: 61 IN | OXYGEN SATURATION: 98 % | TEMPERATURE: 98 F | HEART RATE: 120 BPM | DIASTOLIC BLOOD PRESSURE: 67 MMHG

## 2022-05-01 VITALS — HEART RATE: 97 BPM | OXYGEN SATURATION: 99 % | RESPIRATION RATE: 20 BRPM

## 2022-05-01 DIAGNOSIS — J34.89 OTHER SPECIFIED DISORDERS OF NOSE AND NASAL SINUSES: ICD-10-CM

## 2022-05-01 DIAGNOSIS — J06.9 ACUTE UPPER RESPIRATORY INFECTION, UNSPECIFIED: ICD-10-CM

## 2022-05-01 DIAGNOSIS — R05.1 ACUTE COUGH: ICD-10-CM

## 2022-05-01 PROCEDURE — 99283 EMERGENCY DEPT VISIT LOW MDM: CPT

## 2022-05-01 RX ORDER — PSEUDOEPHEDRINE HCL 30 MG
30 TABLET ORAL ONCE
Refills: 0 | Status: COMPLETED | OUTPATIENT
Start: 2022-05-01 | End: 2022-05-01

## 2022-05-01 RX ORDER — IBUPROFEN 200 MG
600 TABLET ORAL ONCE
Refills: 0 | Status: COMPLETED | OUTPATIENT
Start: 2022-05-01 | End: 2022-05-01

## 2022-05-01 RX ADMIN — Medication 600 MILLIGRAM(S): at 12:29

## 2022-05-01 RX ADMIN — Medication 30 MILLIGRAM(S): at 12:29

## 2022-05-01 NOTE — ED PROVIDER NOTE - CLINICAL SUMMARY MEDICAL DECISION MAKING FREE TEXT BOX
A/P; URI symptoms likely viral, swab sent, Sudafed Motrin as needed, hydration, follow-up PMD 1 to 2 weeks, strict return precautions

## 2022-05-01 NOTE — ED PROVIDER NOTE - ATTENDING CONTRIBUTION TO CARE
27-year-old female no past medical history presents with 2 days of runny nose sore throat dry cough chest congestion.  No chest pain shortness of breath.  No fever.  Sons have same symptoms.    On exam, AFVSS, Well appearing, No acute distress, NCAT, EOMI, PERRLA, MMM, Neck supple, LCTAB, OP no erythema or exudates, RRR nl s1s2 No mrg, , AAOx3, No Focal Deficits, No LE edema or calf TTP,    A/P; URI symptoms likely viral, swab sent, Sudafed Motrin as needed, hydration, follow-up PMD 1 to 2 weeks, strict return precautions

## 2022-05-01 NOTE — ED PROVIDER NOTE - NSFOLLOWUPINSTRUCTIONS_ED_ALL_ED_FT
Upper Respiratory Infection, Adult  An upper respiratory infection (URI) is a common viral infection of the nose, throat, and upper air passages that lead to the lungs. The most common type of URI is the common cold. URIs usually get better on their own, without medical treatment.    What are the causes?  A URI is caused by a virus. You may catch a virus by:    Breathing in droplets from an infected person's cough or sneeze.  Touching something that has been exposed to the virus (contaminated) and then touching your mouth, nose, or eyes.    What increases the risk?  You are more likely to get a URI if:    You are very young or very old.  It is joanne or winter.  You have close contact with others, such as at a , school, or health care facility.  You smoke.  You have long-term (chronic) heart or lung disease.  You have a weakened disease-fighting (immune) system.  You have nasal allergies or asthma.  You are experiencing a lot of stress.  You work in an area that has poor air circulation.  You have poor nutrition.    What are the signs or symptoms?  A URI usually involves some of the following symptoms:    Runny or stuffy (congested) nose.  Sneezing.  Cough.  Sore throat.  Headache.  Fatigue.  Fever.  Loss of appetite.  Pain in your forehead, behind your eyes, and over your cheekbones (sinus pain).  Muscle aches.  Redness or irritation of the eyes.  Pressure in the ears or face.    How is this diagnosed?  This condition may be diagnosed based on your medical history and symptoms, and a physical exam. Your health care provider may use a cotton swab to take a mucus sample from your nose (nasal swab). This sample can be tested to determine what virus is causing the illness.    How is this treated?  URIs usually get better on their own within 7–10 days. You can take steps at home to relieve your symptoms. Medicines cannot cure URIs, but your health care provider may recommend certain medicines to help relieve symptoms, such as:    Over-the-counter cold medicines.  Cough suppressants. Coughing is a type of defense against infection that helps to clear the respiratory system, so take these medicines only as recommended by your health care provider.  Fever-reducing medicines.    Follow these instructions at home:  Activity     Rest as needed.  If you have a fever, stay home from work or school until your fever is gone or until your health care provider says you are no longer contagious. Your health care provider may have you wear a face mask to prevent your infection from spreading.  Relieving symptoms     Gargle with a salt-water mixture 3–4 times a day or as needed. To make a salt-water mixture, completely dissolve ½–1 tsp of salt in 1 cup of warm water.  Use a cool-mist humidifier to add moisture to the air. This can help you breathe more easily.  Eating and drinking     Drink enough fluid to keep your urine pale yellow.  ImageEat soups and other clear broths.  General instructions     Take over-the-counter and prescription medicines only as told by your health care provider. These include cold medicines, fever reducers, and cough suppressants.  Do not use any products that contain nicotine or tobacco, such as cigarettes and e-cigarettes. If you need help quitting, ask your health care provider.   Stay away from secondhand smoke.  Stay up to date on all immunizations, including the yearly (annual) flu vaccine.  ImageKeep all follow-up visits as told by your health care provider. This is important.  How to prevent the spread of infection to others     ImageURIs can be passed from person to person (are contagious). To prevent the infection from spreading:    Wash your hands often with soap and water. If soap and water are not available, use hand .  Avoid touching your mouth, face, eyes, or nose.  Cough or sneeze into a tissue or your sleeve or elbow instead of into your hand or into the air.    Contact a health care provider if:  You are getting worse instead of better.  You have a fever or chills.  Your mucus is brown or red.  You have yellow or brown discharge coming from your nose.  You have pain in your face, especially when you bend forward.  You have swollen neck glands.  You have pain while swallowing.  You have white areas in the back of your throat.  Get help right away if:  You have shortness of breath that gets worse.  You have severe or persistent:    Headache.  Ear pain.  Sinus pain.  Chest pain.    You have chronic lung disease along with any of the following:    Wheezing.  Prolonged cough.  Coughing up blood.  A change in your usual mucus.    You have a stiff neck.  You have changes in your:    Vision.  Hearing.  Thinking.  Mood.    Summary  An upper respiratory infection (URI) is a common infection of the nose, throat, and upper air passages that lead to the lungs.  A URI is caused by a virus.  URIs usually get better on their own within 7–10 days.  Medicines cannot cure URIs, but your health care provider may recommend certain medicines to help relieve symptoms.  This information is not intended to replace advice given to you by your health care provider. Make sure you discuss any questions you have with your health care provider.

## 2022-05-01 NOTE — ED PROVIDER NOTE - OBJECTIVE STATEMENT
27 y f, no pmh, pw sinus congestion, started 3 days ago, +runny nose, +maxillary sinus pressure, mild cough. Pt's kids have similar symptoms. Denies f/c, cp, sob, n/v/d, abd pain dysuria

## 2022-05-01 NOTE — ED PROVIDER NOTE - PATIENT PORTAL LINK FT
You can access the FollowMyHealth Patient Portal offered by [facility] by registering at the following website: [email or web address]. By joining [employer]’s FollowMyHealth portal, you will also be able to view your health information using other applications (apps) compatible with our system.

## 2022-07-12 NOTE — ED PROVIDER NOTE - CROS ED RESP ALL NEG
-- DO NOT REPLY / DO NOT REPLY ALL --  -- Message is from the [facility]--    Offered Waitlist if Available for the Visit Type? Yes    Caller is requesting an appointment - at a sooner time than what was available.      Caller wants sooner appointment - offered trusted partner & sister site            Patient is willing to be seen by: PCP only or partners     Reason for Visit: patient requesting depression medication and to discuss option, patient can have a virtual appointment also. Please call to schedule.     Is the patient currently scheduled? Yes.  Appointment date:  7/28    Preferred time to be seen: any time this week except Friday, and next week except July 21st, morning hour or any time.     Caller Information       Type Contact Phone/Fax    07/12/2022 12:32 PM CDT Phone (Incoming) Aura Frey (Self) [phone] (M)          Alternative phone number: none    Turnaround time given to caller:   \"This message will be sent to [state Provider's name]. The clinical team will fulfill your request as soon as they review your message.\"   - - -

## 2022-08-23 NOTE — ED ADULT NURSE NOTE - HIV OFFER
"Sent Surgery Packet to patient via MyChart and Mail including scheduling information and prep instructions:    Your surgery is scheduled:    Date: 2022  ________________________________    Time: 1:30 PM*  ________________________________    Please arrive at:  12:00 PM*  ______________________    Surgeon's Name: Dr. Tahir Pacheco  _______________________  **Please bring Proof of COVID vaccination on surgery date**         Pre-Op Physical Fax Numbers:         [facility]  [facility]  Phone: [phone]  Fax: [phone]        Your surgery is located at:  [facility]  [address], [address]  [place] [address]    Nurse Line: [phone]  [phone]     *Times are tentative and may change. You can expect a call from the pre-admission nurses to confirm arrival and surgery start time if the times should change.       Required: Yes, you will need a  18 years or older to drive you home from your procedure as well as stay with you for 24 hours after your procedure    Preparation: 2 Fleet Enemas (See \"Day of Surgery\")    Upcoming Related Appointments:      Sep 14, 2022  3:00 PM  (Arrive by 2:45 PM)  PAC EVALUATION with GERARDO Perez [facility] [place] ([facility] ) [phone]    [address] [place] [address]            "
Case Request received to schedule:    Patient Name: Yonis Mcnair   MRN: [id number]   Case#: [id number]   Surgeon(s) and Role:      * Tahir Pacheco MD - Primary   Date requested: * No dates entered *   Location: [facility] OR   Procedure(s):   EXAM UNDER ANESTHESIA, ANUS, fulguration of anal condylomata (N/A)   FULGURATION, CONDYLOMA, RECTUM (N/A)     Additional Instructions for the Case  Multi-surgeon case:  no  Time in minutes:  30  Anesthesia: MAC  Prep: 2 fleets  Pre-Op: PAC  Labs: no  WOC: no  Special equipment: no  Special Instructions: [facility] ok    Schedule with Agustina Nolasco NP 2-3 weeks after surgery.  Schedule with Surgeon 3-4 weeks after Agustina Nolasco NP    On 7/19/2022:  LVM for patient to call back regarding scheduling.    Mary Rogel  Lisa-op Coordinator  [facility]  Direct Phone: [phone]     
Previously Declined (within the last year)

## 2023-04-17 NOTE — ED PROVIDER NOTE - PROGRESS NOTE DETAILS
Application Tool (Optional): Liquid Nitrogen Sprayer Render Note In Bullet Format When Appropriate: No Show Applicator Variable?: Yes Duration Of Freeze Thaw-Cycle (Seconds): 5 Post-Care Instructions: I reviewed with the patient in detail post-care instructions. Patient is to wear sunprotection, and avoid picking at any of the treated lesions. Pt may apply Vaseline to crusted or scabbing areas. Number Of Freeze-Thaw Cycles: 2 freeze-thaw cycles Detail Level: Detailed Consent: The patient's consent was obtained including but not limited to risks of crusting, scabbing, blistering, scarring, darker or lighter pigmentary change, recurrence, incomplete removal and infection. Pt refusing tylenol Discussed results with patient. Discussed that patient has questionable splenomegaly.

## 2023-04-21 NOTE — ED PROVIDER NOTE - CROS ED SKIN ALL NEG
----- Message from Be Mcpherson sent at 4/21/2023 11:02 AM EDT -----  Regarding: RE: denied  Patient declined doing gxt (because they do not have a female tech available) just want to hold off on testing until her f/u with you   ----- Message -----  From: Louise Vickers APRN  Sent: 4/21/2023  10:22 AM EDT  To: Be Mcpherson  Subject: RE: denied                                       Order placed for GXT  ----- Message -----  From: Jeferson Joy RegSched Rep  Sent: 4/21/2023  10:00 AM EDT  To: SANDY Umanzor  Subject: denied                                           [facility] would you like to try a different testing ?            
negative...

## 2023-05-11 NOTE — ED PROVIDER NOTE - NSFOLLOWUPCLINICS_GEN_ALL_ED_FT
Detail Level: Detailed Add 14481 Cpt? (Important Note: In 2017 The Use Of 11578 Is Being Tracked By Cms To Determine Future Global Period Reimbursement For Global Periods): yes Wound Evaluated By: Christianne MATUTE [facility]  Dental  [address] [place] [address]  Phone: [phone]  Fax:   Follow Up Time: 1-3 Days

## 2023-06-06 NOTE — ED PROVIDER NOTE - CROS ED CONS ALL NEG
negative... V-Y Plasty Text: The defect edges were debeveled with a #15 scalpel blade. Given the location of the defect, shape of the defect and the proximity to free margins an V-Y advancement flap was deemed most appropriate. Using a sterile surgical marker, an appropriate advancement flap was drawn incorporating the defect and placing the expected incisions within the relaxed skin tension lines where possible. The area thus outlined was incised deep to adipose tissue with a #15 scalpel blade. The skin margins were undermined to an appropriate distance in all directions utilizing iris scissors. Following this, the designed flap was advanced and carried over into the primary defect and sutured into place.

## 2023-07-22 ENCOUNTER — EMERGENCY (EMERGENCY)
Facility: HOSPITAL | Age: 28
LOS: 0 days | Discharge: HOME | End: 2023-07-22
Attending: EMERGENCY MEDICINE
Payer: MEDICAID

## 2023-07-22 VITALS
RESPIRATION RATE: 16 BRPM | TEMPERATURE: 99 F | OXYGEN SATURATION: 99 % | DIASTOLIC BLOOD PRESSURE: 81 MMHG | WEIGHT: 164.91 LBS | SYSTOLIC BLOOD PRESSURE: 122 MMHG | HEART RATE: 90 BPM

## 2023-07-22 DIAGNOSIS — K08.89 OTHER SPECIFIED DISORDERS OF TEETH AND SUPPORTING STRUCTURES: ICD-10-CM

## 2023-07-22 DIAGNOSIS — F17.290 NICOTINE DEPENDENCE, OTHER TOBACCO PRODUCT, UNCOMPLICATED: ICD-10-CM

## 2023-07-22 DIAGNOSIS — K02.9 DENTAL CARIES, UNSPECIFIED: ICD-10-CM

## 2023-07-22 DIAGNOSIS — F84.0 AUTISTIC DISORDER: ICD-10-CM

## 2023-07-22 PROCEDURE — 96372 THER/PROPH/DIAG INJ SC/IM: CPT

## 2023-07-22 PROCEDURE — 99283 EMERGENCY DEPT VISIT LOW MDM: CPT

## 2023-07-22 PROCEDURE — 99283 EMERGENCY DEPT VISIT LOW MDM: CPT | Mod: 25

## 2023-07-22 RX ORDER — KETOROLAC TROMETHAMINE 30 MG/ML
30 SYRINGE (ML) INJECTION ONCE
Refills: 0 | Status: DISCONTINUED | OUTPATIENT
Start: 2023-07-22 | End: 2023-07-22

## 2023-07-22 RX ADMIN — Medication 1 TABLET(S): at 22:08

## 2023-07-22 RX ADMIN — Medication 30 MILLIGRAM(S): at 22:10

## 2023-07-22 NOTE — ED PROVIDER NOTE - OBJECTIVE STATEMENT
28 year-old female with past medical history autism spectrum disorder presents with complaint of dental pain. Patient reports she has had pain in her left posterior inferior wisdom tooth for the past month, and has follow-up scheduled with a dentist on Monday for extraction of that tooth. States she has tried PO medication for pain control (reports she has taken 2000mg tylenol today) without relief. Denies fever/chills, hoarseness, voice change, cough, sore throat, trismus, drooling, nausea/vomiting, difficulty tolerating PO.

## 2023-07-22 NOTE — ED PROVIDER NOTE - PHYSICAL EXAMINATION
Physical Exam    Vital Signs: I have reviewed the initial vital signs.  Constitutional: appears stated age, no acute distress  Eyes: Sclera clear, EOMI.  ENT: OP is clear without exudates, notable dental caries to tooth 32, normal gingival, tongue without swelling  Cardiovascular: S1 and S2, regular rate, regular rhythm, well-perfused extremities, radial pulses equal and 2+  Respiratory: unlabored respiratory effort, clear to auscultation bilaterally  Integumentary: warm, dry, no rash  Neurologic: awake, alert, oriented x3

## 2023-07-22 NOTE — ED PROVIDER NOTE - PROGRESS NOTE DETAILS
AY: The patient was given detailed return precautions and advised to return to the emergency department if any new symptoms developed, symptoms worsened or for any concerns. The patient was offered the opportunity to ask questions and verbalized that they understand the diagnosis and discharge instructions.

## 2023-07-22 NOTE — ED PROVIDER NOTE - NSFOLLOWUPINSTRUCTIONS_ED_ALL_ED_FT
Medications were sent to your pharmacy. Take them as prescribed.  Follow-up with your dentist in 2 days.    Dental Pain    Dental pain (toothache) may be caused by many things including tooth decay (cavities or caries), abscess or infection, injury, or the reason may be unknown. Your pain may only occur when you are chewing, are exposed to hot or cold temperature, are eating or drinking sugary foods or beverages, or your pain may be constant. If you were prescribed an antibiotic medicine, finish all of it even if you start to feel better. Rinsing your mouth with salt water or applying ice to the painful area of your face may help with the pain.    SEEK IMMEDIATE MEDICAL CARE IF YOU HAVE THE FOLLOWING SYMPTOMS: unable to open mouth, trouble breathing or swallowing, fever, or swelling of the face, neck or jaw.

## 2023-07-22 NOTE — ED PROVIDER NOTE - PATIENT PORTAL LINK FT
You can access the FollowMyHealth Patient Portal offered by [facility] by registering at the following website: [email or web address]. By joining [employer]’s FollowMyHealth portal, you will also be able to view your health information using other applications (apps) compatible with our system. ambulatory

## 2023-07-22 NOTE — ED PROVIDER NOTE - NSCAREINITIATED _GEN_ER
[address]   OPERATIVE REPORT    Name:  Virgen Vigil  MR#:  [id number]  :  1993  ACCOUNT #:  [de-identified]  DATE OF SERVICE:  2022    PREOPERATIVE DIAGNOSES:  Adenomyosis, menorrhagia, dysmenorrhea, pelvic pain with dyspareunia. POSTOPERATIVE DIAGNOSES:  Adenomyosis, menorrhagia, dysmenorrhea, pelvic pain with dyspareunia. PROCEDURE PERFORMED:  [facility] assisted total laparoscopic hysterectomy and bilateral salpingectomy. SURGEON:  Katie Damico MD    ASSISTANT:  None    ANESTHESIA:  General.    COMPLICATIONS:  None. SPECIMENS REMOVED:  Uterus, cervix, and tubes. IMPLANTS:  none. ESTIMATED BLOOD LOSS:  Less than 300, approximately 100. FLUIDS:  In 1 L, out 200. FINDINGS:  Enlarged uterus consistent with adenomyosis, stenotic cervix. Normal-appearing ovaries and tubes. PROCEDURE:  The patient had informed consent performed. The patient was prepped and draped in the usual fashion. After performing adequate anesthesia, time-out, and appropriate use of SCIP protocol antibiotics, decision was made to begin. Our attention was first turned to the supraumbilical area. A vertical incision of 1 cm length was made. A Veress needle was inserted. Opening pressure of 4 was noted. The abdomen was infiltrated with 3.2 liters of CO2 to a pressure of 15. An operative port was placed. Proper placement was confirmed by direct visualization. Three additional ports were placed 10 cm lateral to that. The patient was placed in Trendelenburg. Some adhesions had been noted between the omentum and the anterior abdominal wall as well as bladder and anterior abdominal wall. Decision was made to dock the robot. The robot was docked. We then went vaginally. A Willard catheter was inserted. The cervix was visualized and medium VCare was placed without difficulty. At that time, the surgery began. Our attention was first turned to the right tube.   The right tube was identified, isolated, clamping and sealing the mesosalpinx and then cutting it. We then proceeded up until we could come across the utero-ovarian ligament. The utero-ovarian ligament was clamped, cut, and sealed. We proceeded down the broad ligament to the round ligament down the cardinal ligament to the uterine vessels. The bladder flap was created anteriorly. We then proceeded to be on the contralateral side. A circumferential incision was made against the [facility] uterine manipulator. The uterus was delivered through the vagina as were the tubes. The vaginal cuff was then closed with 2-0 Stratafix in a running fashion. A second imbricating layer was used to bolster support and close the peritoneum with excellent restoration of normal anatomy and hemostasis. Decision was made to end the procedure. The uterus was removed without difficulty. The abdomen was drained of CO2. The three ports were removed. The port sites were closed with 3-0 Monocryl and 10 mL of 0.25% Marcaine were injected. Willard catheter was removed. The uterus and tubes were sent to Pathology. All counts were correct. There were no immediate complications.         Marbella Morrison MD      [id number]/  D:  06/03/2022 23:20  T:  06/04/2022 4:01  JOB #:  [id number]  CC:  Rafael Campos MD Faiza Foss)

## 2023-07-22 NOTE — ED PROVIDER NOTE - ATTENDING APP SHARED VISIT CONTRIBUTION OF CARE
Patient is c/o molar toothache, denies trauma. Denies HA/neck pain/f/c/rash.   Vitals reviewed.   Patient is awake, alert, answering questions appropriately, appears comfortable and not in any distress.  Face: No swelling/redness/crepitus.   no trismus, no drooling.   oral cavity shows: no abscess/discharge/ulcers.   supple neck,   A/P: toothache,   medications,   close outpatient follow up.

## 2024-04-01 NOTE — ED ADULT NURSE NOTE - GASTROINTESTINAL ASSESSMENT
72 yo fm   pt had had lots of gas and RLQ  intermittent abdominal pain x a few months  , also complains of gas daily. abdominal pain occurs  2 x a week, denies diarrhea. has always had a tendency for constipation, uses a stool softener when needed. BSS   type 3 and sometimes type 1. Has a BM every 2 days, denies fever, vomiting, dysphagia, weight loss, nausea, blood in stool. Some buring in chest at night. no abdominal surgeries br
br
brColonoscopy 5/15/23 benign adenomatous polyp, recall 3 years, diverticulosis.  br
Labs December 2023 reviewed  WDL

## 2024-06-09 ENCOUNTER — EMERGENCY (EMERGENCY)
Facility: HOSPITAL | Age: 29
LOS: 0 days | Discharge: HOME | End: 2024-06-09
Attending: EMERGENCY MEDICINE
Payer: MEDICAID

## 2024-06-09 VITALS
DIASTOLIC BLOOD PRESSURE: 71 MMHG | OXYGEN SATURATION: 98 % | HEIGHT: 61 IN | RESPIRATION RATE: 18 BRPM | WEIGHT: 164.91 LBS | TEMPERATURE: 98 F | HEART RATE: 90 BPM | SYSTOLIC BLOOD PRESSURE: 116 MMHG

## 2024-06-09 DIAGNOSIS — K08.89 OTHER SPECIFIED DISORDERS OF TEETH AND SUPPORTING STRUCTURES: ICD-10-CM

## 2024-06-09 DIAGNOSIS — K03.81 CRACKED TOOTH: ICD-10-CM

## 2024-06-09 DIAGNOSIS — F84.0 AUTISTIC DISORDER: ICD-10-CM

## 2024-06-09 PROCEDURE — 99283 EMERGENCY DEPT VISIT LOW MDM: CPT | Mod: 25

## 2024-06-09 PROCEDURE — 99283 EMERGENCY DEPT VISIT LOW MDM: CPT

## 2024-06-09 PROCEDURE — 96372 THER/PROPH/DIAG INJ SC/IM: CPT

## 2024-06-09 RX ORDER — KETOROLAC TROMETHAMINE 30 MG/ML
30 SYRINGE (ML) INJECTION ONCE
Refills: 0 | Status: DISCONTINUED | OUTPATIENT
Start: 2024-06-09 | End: 2024-06-09

## 2024-06-09 RX ORDER — AMOXICILLIN 250 MG/5ML
500 SUSPENSION, RECONSTITUTED, ORAL (ML) ORAL ONCE
Refills: 0 | Status: COMPLETED | OUTPATIENT
Start: 2024-06-09 | End: 2024-06-09

## 2024-06-09 RX ORDER — KETOROLAC TROMETHAMINE 30 MG/ML
1 SYRINGE (ML) INJECTION
Qty: 12 | Refills: 0
Start: 2024-06-09 | End: 2024-06-11

## 2024-06-09 RX ORDER — AMOXICILLIN 250 MG/5ML
1 SUSPENSION, RECONSTITUTED, ORAL (ML) ORAL
Qty: 20 | Refills: 0
Start: 2024-06-09 | End: 2024-06-18

## 2024-06-09 RX ADMIN — Medication 30 MILLIGRAM(S): at 16:46

## 2024-06-09 RX ADMIN — Medication 500 MILLIGRAM(S): at 16:47

## 2024-06-09 NOTE — ED PROVIDER NOTE - PHYSICAL EXAMINATION
CONSTITUTIONAL: Well-appearing; in no apparent distress.   Mouth: tooth #15 noticed with partially missing tooth; tender to palpation; no abscess or tongue swelling noticed.  RESPIRATORY: No signs of respiratory distress.   CARDIOVASCULAR: Regular rate and rhythm.   NEURO: A & O x 3. Normal speech. No focal deficit.  PSYCHOLOGICAL: Appropriate mood and affect. Good judgement and insight.

## 2024-06-09 NOTE — ED PROVIDER NOTE - NS ED MD DISPO DISCHARGE CCDA
Detail Level: Generalized
Detail Level: Detailed
Patient/Caregiver provided printed discharge information.

## 2024-06-09 NOTE — ED PROVIDER NOTE - NSFOLLOWUPINSTRUCTIONS_ED_ALL_ED_FT
Please make sure to follow up with your primary care doctor in 3 days.    Please make sure to follow up with your dentist or [facility] Monday through Friday from 9am to 4pm, except for holidays.    Toothache    WHAT YOU NEED TO KNOW:    A toothache is pain that is caused by irritation of the nerves in the center of your tooth. The irritation may be caused by several problems, such as a cavity, an infection, a cracked tooth, or gum disease. Tooth Anatomy         DISCHARGE INSTRUCTIONS:    Return to the emergency department if:     You have trouble breathing or swallowing.       You have swelling in your face or neck.     Contact your dentist if:     You have a fever and chills.       You have trouble opening or closing your mouth.       You have swelling around your tooth.       You have questions or concerns about your condition or care.    Medicines: You may need any of the following:     NSAIDs, such as ibuprofen, help decrease swelling, pain, and fever. This medicine is available with or without a doctor's order. NSAIDs can cause stomach bleeding or kidney problems in certain people. If you take blood thinner medicine, always ask if NSAIDs are safe for you. Always read the medicine label and follow directions. Do not give these medicines to children under 6 months of age without direction from your child's healthcare provider.      Acetaminophen decreases pain and fever. It is available without a doctor's order. Ask how much to take and how often to take it. Follow directions. Acetaminophen can cause liver damage if not taken correctly.      Prescription pain medicine may be given. Ask your healthcare provider how to take this medicine safely. Some prescription pain medicines contain acetaminophen. Do not take other medicines that contain acetaminophen without talking to your healthcare provider. Too much acetaminophen may cause liver damage. Prescription pain medicine may cause constipation. Ask your healthcare provider how to prevent or treat constipation.       Antibiotics help treat or prevent a bacterial infection.       Take your medicine as directed. Contact your healthcare provider if you think your medicine is not helping or if you have side effects. Tell him of her if you are allergic to any medicine. Keep a list of the medicines, vitamins, and herbs you take. Include the amounts, and when and why you take them. Bring the list or the pill bottles to follow-up visits. Carry your medicine list with you in case of an emergency.    Self-care:     Rinse your mouth with warm salt water 4 times a day or as directed.       Eat soft foods to help relieve pain caused by chewing.       Apply ice on your jaw or cheek for 15 to 20 minutes every hour or as directed. Use an ice pack, or put crushed ice in a plastic bag. Cover it with a towel before you apply it. Ice helps prevent tissue damage and decreases swelling and pain.    Help prevent a toothache:     Brush your teeth at least 2 times a day.      Use dental floss to clean between your teeth at least 1 time a day.      See your dentist regularly every 6 months for dental cleanings and oral exams.    Follow up with your dentist as directed: You may be referred to a dental surgeon. Write down your questions so you remember to ask them during your visits.        © Copyright [employer] 2019 All illustrations and images included in CareNotes are the copyrighted property of [employer].D.A[employer]., Inc[employer] or [employer].

## 2024-06-09 NOTE — ED PROVIDER NOTE - OBJECTIVE STATEMENT
29-year-old female with a past medical history of autism who presents to the ED with left upper dental pain.  Reports that symptoms started 2 days ago; pain is constant, and worse with chewing.  Reports that she had a crack in the same tooth before and is supposed to see her dentist next week.  Denies fever, dental abscess, tongue swelling, and shortness of breath.

## 2024-06-09 NOTE — ED PROVIDER NOTE - CLINICAL SUMMARY MEDICAL DECISION MAKING FREE TEXT BOX
29-year-old female with no sig PMHx, ER with c/o L upper dental pain.  States she cracked her tooth a few weeks ago, started to hurt for the past 2 days.  Denies any swelling.  No F/C.  Tolerating p.o. without difficulty.  No difficulty in swallowing or breathing.  Has appointment with dentist next week.  PE - nad, nc/at, eomi, perrl, op - clear, mmm, no pharyngeal erythema, + cracked tooth # 15, + TTP, no swelling/visible abscess, neck supple, cta b/l, no w/r/r, rrr, abd- soft, nt/nd, nabs, from x 4, no LE swelling/tenderness, A&O x 3, cn 2-12 intact, no focal motor/sensory deficits.   -NSAIDs for pain, p.o. antibiotics, follow-up dental.  Patient does return to ER for worsening pain, swelling, fever, difficulty with swallowing or breathing, or any other new/concerning symptoms.  Patient understands and agrees with plan.

## 2024-06-09 NOTE — ED PROVIDER NOTE - PROGRESS NOTE DETAILS
Meds given in ED and symptoms improved.  I will send medication to pharmacy.  Patient is stable for discharge.  Will have patient follow-up with her own dentist outpatient.

## 2024-06-09 NOTE — ED PROVIDER NOTE - PATIENT PORTAL LINK FT
You can access the FollowMyHealth Patient Portal offered by [facility] by registering at the following website: [email or web address]. By joining [employer]’s FollowMyHealth portal, you will also be able to view your health information using other applications (apps) compatible with our system.

## 2024-06-11 NOTE — ED ADULT NURSE NOTE - NS ED NURSE DC PT EDUCATION RESOURCES
Referral for Opioid Wean Program. Medication history completed by technician: Yes. Pharmacist telephone visit is scheduled for 6/13/24 at 3:30 PM.    Stacia Desai  [facility] Pharmacy Technician   6/11/2024 10:57 AM   None needed

## 2024-08-12 NOTE — ED PROVIDER NOTE - WORK/EXCUSE FORM DATE
Initiate Treatment: Efudex 5 % topical cream \\nQuantity: 40.0 g  Days Supply: 30\\nSig: AAA on back BID x 2 weeks Render In Strict Bullet Format?: Yes Detail Level: Zone Initiate Treatment: ketoconazole 2 % topical cream \\nQuantity: 30.0 g  Days Supply: 30\\nSig: Apply to itchy spot on ankle bid x 2 weeks 24-Sep-2021

## 2024-08-21 ENCOUNTER — NON-APPOINTMENT (OUTPATIENT)
Age: 29
End: 2024-08-21

## 2024-08-22 ENCOUNTER — EMERGENCY (EMERGENCY)
Facility: HOSPITAL | Age: 29
LOS: 0 days | Discharge: HOME | End: 2024-08-22
Attending: EMERGENCY MEDICINE
Payer: MEDICAID

## 2024-08-22 VITALS
SYSTOLIC BLOOD PRESSURE: 108 MMHG | TEMPERATURE: 98 F | OXYGEN SATURATION: 100 % | HEART RATE: 84 BPM | WEIGHT: 154.1 LBS | HEIGHT: 61 IN | RESPIRATION RATE: 18 BRPM | DIASTOLIC BLOOD PRESSURE: 68 MMHG

## 2024-08-22 DIAGNOSIS — M54.89 OTHER DORSALGIA: ICD-10-CM

## 2024-08-22 DIAGNOSIS — F84.0 AUTISTIC DISORDER: ICD-10-CM

## 2024-08-22 DIAGNOSIS — Z76.0 ENCOUNTER FOR ISSUE OF REPEAT PRESCRIPTION: ICD-10-CM

## 2024-08-22 PROCEDURE — 99281 EMR DPT VST MAYX REQ PHY/QHP: CPT

## 2024-08-22 PROCEDURE — 99283 EMERGENCY DEPT VISIT LOW MDM: CPT

## 2024-08-22 NOTE — ED PROVIDER NOTE - CLINICAL SUMMARY MEDICAL DECISION MAKING FREE TEXT BOX
29-year-old female presents to the emergency department complaining of upper back pain needing prescription refill.  States she has been taking ketorolac for 6 weeks and has an appoint with outpatient pain management.  Denies new symptoms and states she is having a blood checked for NSAID induced nephropathy.  I discussed the risks and benefits of initiating another ketorolac prescription with her, that medically the risks outweigh the benefits and offered alternative strategy for pain control of which she did not accept.  We discussed alternative prescriptions, over-the-counter NSAIDs, lidocaine patches, NSAID creams.  She states she is unable to put creams on her back and also has sensory situation which prevents topical administration from being used.  Will discharge with return precautions.

## 2024-08-22 NOTE — ED PROVIDER NOTE - ATTENDING CONTRIBUTION TO CARE
I personally evaluated the patient. I reviewed the Resident's or Physician Assistant's note (as assigned above), and agree with the findings and plan except as documented in my note.    29-year-old female here for refill of ketorolac which she has been on for 6 weeks for chronic upper back pain, nothing acute no injuries.  Has an appointment with outpatient pain management next week.  No other complaints.  States she is having her blood drawn for checking of her kidney function to prolong the ketorolac prescription.    Denies other new symptoms    GENERAL: female in no distress.   CHEST: normal work of breathing noted  CV: pulses intact   EXTR: FROM   NEURO: AAO 3 no focal deficits  SKIN: normal no pallor     Impression: Medication refill    Plan: Reevaluation, outpatient care

## 2024-08-22 NOTE — ED PROVIDER NOTE - OBJECTIVE STATEMENT
29-year-old female past medical history of autism spectrum presents today for medication refill of her Toradol.  Patient said she gets prescribed Toradol by her primary care doctor who refused to prescribe it for her anymore.  Patient went to a new primary doctor who also refused to prescribe it.  Patient has pain management appointment for spinal stenosis next week but is requesting Toradol

## 2024-08-22 NOTE — ED PROVIDER NOTE - NSDCPRINTRESULTS_ED_ALL_ED
Patient requests all Lab, Cardiology, and Radiology Results on their Discharge Instructions
Cardiac Monitor/Defib/ACLS/Rescue Kit/O2/BVM

## 2024-08-22 NOTE — ED PROVIDER NOTE - PHYSICAL EXAMINATION
CONSTITUTIONAL: Well-developed; well-nourished; in no acute distress.   SKIN: warm, dry  HEAD: Normocephalic; atraumatic.  EYES: PERRL, EOMI, no conjunctival erythema  ENT: No nasal discharge; airway clear.  NECK: Supple; non tender.  CARD: Regular rate and rhythm.   RESP: No wheezes, rales or rhonchi.  ABD: soft ntnd  EXT: Normal ROM.  No clubbing, cyanosis or edema.   PSYCH: Cooperative, appropriate.

## 2024-08-22 NOTE — ED PROVIDER NOTE - PATIENT PORTAL LINK FT
You can access the FollowMyHealth Patient Portal offered by [facility] by registering at the following website: [email or web address]. By joining [employer]’s FollowMyHealth portal, you will also be able to view your health information using other applications (apps) compatible with our system.
